# Patient Record
Sex: FEMALE | Race: WHITE
[De-identification: names, ages, dates, MRNs, and addresses within clinical notes are randomized per-mention and may not be internally consistent; named-entity substitution may affect disease eponyms.]

---

## 2017-09-07 ENCOUNTER — HOSPITAL ENCOUNTER (OUTPATIENT)
Dept: HOSPITAL 76 - SDS | Age: 72
Discharge: HOME | End: 2017-09-07
Attending: SURGERY
Payer: MEDICARE

## 2017-09-07 VITALS — SYSTOLIC BLOOD PRESSURE: 113 MMHG | DIASTOLIC BLOOD PRESSURE: 53 MMHG

## 2017-09-07 DIAGNOSIS — K57.90: ICD-10-CM

## 2017-09-07 DIAGNOSIS — D12.0: Primary | ICD-10-CM

## 2017-09-07 DIAGNOSIS — I10: ICD-10-CM

## 2017-09-07 DIAGNOSIS — Z85.3: ICD-10-CM

## 2017-09-07 DIAGNOSIS — K21.9: ICD-10-CM

## 2017-09-07 DIAGNOSIS — Z87.891: ICD-10-CM

## 2017-09-07 PROCEDURE — 0DBH8ZX EXCISION OF CECUM, VIA NATURAL OR ARTIFICIAL OPENING ENDOSCOPIC, DIAGNOSTIC: ICD-10-PCS | Performed by: SURGERY

## 2017-09-07 PROCEDURE — 88305 TISSUE EXAM BY PATHOLOGIST: CPT

## 2017-09-07 PROCEDURE — 45380 COLONOSCOPY AND BIOPSY: CPT

## 2019-02-21 ENCOUNTER — HOSPITAL ENCOUNTER (OUTPATIENT)
Dept: HOSPITAL 76 - ED | Age: 74
Setting detail: OBSERVATION
LOS: 1 days | Discharge: HOME | End: 2019-02-22
Attending: SPECIALIST | Admitting: SPECIALIST
Payer: MEDICAID

## 2019-02-21 DIAGNOSIS — K76.0: ICD-10-CM

## 2019-02-21 DIAGNOSIS — N28.9: ICD-10-CM

## 2019-02-21 DIAGNOSIS — R94.5: ICD-10-CM

## 2019-02-21 DIAGNOSIS — I11.0: Primary | ICD-10-CM

## 2019-02-21 DIAGNOSIS — G35: ICD-10-CM

## 2019-02-21 DIAGNOSIS — Z87.891: ICD-10-CM

## 2019-02-21 DIAGNOSIS — I48.91: ICD-10-CM

## 2019-02-21 DIAGNOSIS — I50.21: ICD-10-CM

## 2019-02-21 DIAGNOSIS — F32.9: ICD-10-CM

## 2019-02-21 DIAGNOSIS — J44.9: ICD-10-CM

## 2019-02-21 DIAGNOSIS — Z85.3: ICD-10-CM

## 2019-02-21 LAB
ALBUMIN DIAFP-MCNC: 4.1 G/DL (ref 3.2–5.5)
ALBUMIN/GLOB SERPL: 1.1 {RATIO} (ref 1–2.2)
ALP SERPL-CCNC: 75 IU/L (ref 42–121)
ALT SERPL W P-5'-P-CCNC: 181 IU/L (ref 10–60)
ANION GAP SERPL CALCULATED.4IONS-SCNC: 14 MMOL/L (ref 6–13)
AST SERPL W P-5'-P-CCNC: 115 IU/L (ref 10–42)
BASOPHILS NFR BLD AUTO: 0.1 10^3/UL (ref 0–0.1)
BASOPHILS NFR BLD AUTO: 0.6 %
BILIRUB BLD-MCNC: 1.9 MG/DL (ref 0.2–1)
BUN SERPL-MCNC: 43 MG/DL (ref 6–20)
CALCIUM UR-MCNC: 10.2 MG/DL (ref 8.5–10.3)
CHLORIDE SERPL-SCNC: 106 MMOL/L (ref 101–111)
CO2 SERPL-SCNC: 20 MMOL/L (ref 21–32)
CREAT SERPLBLD-SCNC: 1.5 MG/DL (ref 0.4–1)
EOSINOPHIL # BLD AUTO: 0.1 10^3/UL (ref 0–0.7)
EOSINOPHIL NFR BLD AUTO: 0.5 %
ERYTHROCYTE [DISTWIDTH] IN BLOOD BY AUTOMATED COUNT: 14.3 % (ref 12–15)
GFRSERPLBLD MDRD-ARVRAT: 34 ML/MIN/{1.73_M2} (ref 89–?)
GLOBULIN SER-MCNC: 3.6 G/DL (ref 2.1–4.2)
GLUCOSE SERPL-MCNC: 123 MG/DL (ref 70–100)
HGB UR QL STRIP: 15 G/DL (ref 12–16)
LIPASE SERPL-CCNC: 40 U/L (ref 22–51)
LYMPHOCYTES # SPEC AUTO: 1.3 10^3/UL (ref 1.5–3.5)
LYMPHOCYTES NFR BLD AUTO: 11.1 %
MCH RBC QN AUTO: 32.8 PG (ref 27–31)
MCHC RBC AUTO-ENTMCNC: 34.7 G/DL (ref 32–36)
MCV RBC AUTO: 94.6 FL (ref 81–99)
MONOCYTES # BLD AUTO: 0.7 10^3/UL (ref 0–1)
MONOCYTES NFR BLD AUTO: 5.7 %
NEUTROPHILS # BLD AUTO: 9.8 10^3/UL (ref 1.5–6.6)
NEUTROPHILS # SNV AUTO: 11.9 X10^3/UL (ref 4.8–10.8)
NEUTROPHILS NFR BLD AUTO: 82.1 %
PDW BLD AUTO: 9.6 FL (ref 7.9–10.8)
PLATELET # BLD: 225 10^3/UL (ref 130–450)
PROT SPEC-MCNC: 7.7 G/DL (ref 6.7–8.2)
RBC MAR: 4.59 10^6/UL (ref 4.2–5.4)
SODIUM SERPLBLD-SCNC: 140 MMOL/L (ref 135–145)

## 2019-02-21 PROCEDURE — 83880 ASSAY OF NATRIURETIC PEPTIDE: CPT

## 2019-02-21 PROCEDURE — 93005 ELECTROCARDIOGRAM TRACING: CPT

## 2019-02-21 PROCEDURE — 76700 US EXAM ABDOM COMPLETE: CPT

## 2019-02-21 PROCEDURE — 80074 ACUTE HEPATITIS PANEL: CPT

## 2019-02-21 PROCEDURE — 85025 COMPLETE CBC W/AUTO DIFF WBC: CPT

## 2019-02-21 PROCEDURE — 84443 ASSAY THYROID STIM HORMONE: CPT

## 2019-02-21 PROCEDURE — 78582 LUNG VENTILAT&PERFUS IMAGING: CPT

## 2019-02-21 PROCEDURE — 96375 TX/PRO/DX INJ NEW DRUG ADDON: CPT

## 2019-02-21 PROCEDURE — 84484 ASSAY OF TROPONIN QUANT: CPT

## 2019-02-21 PROCEDURE — 96376 TX/PRO/DX INJ SAME DRUG ADON: CPT

## 2019-02-21 PROCEDURE — 83690 ASSAY OF LIPASE: CPT

## 2019-02-21 PROCEDURE — 36415 COLL VENOUS BLD VENIPUNCTURE: CPT

## 2019-02-21 PROCEDURE — 93306 TTE W/DOPPLER COMPLETE: CPT

## 2019-02-21 PROCEDURE — 71045 X-RAY EXAM CHEST 1 VIEW: CPT

## 2019-02-21 PROCEDURE — 80053 COMPREHEN METABOLIC PANEL: CPT

## 2019-02-21 PROCEDURE — 80048 BASIC METABOLIC PNL TOTAL CA: CPT

## 2019-02-21 PROCEDURE — 99284 EMERGENCY DEPT VISIT MOD MDM: CPT

## 2019-02-21 PROCEDURE — 99291 CRITICAL CARE FIRST HOUR: CPT

## 2019-02-21 PROCEDURE — 96374 THER/PROPH/DIAG INJ IV PUSH: CPT

## 2019-02-21 RX ADMIN — APIXABAN SCH MG: 2.5 TABLET, FILM COATED ORAL at 21:01

## 2019-02-21 RX ADMIN — SODIUM CHLORIDE, PRESERVATIVE FREE PRN ML: 5 INJECTION INTRAVENOUS at 19:41

## 2019-02-21 NOTE — HISTORY & PHYSICAL EXAMINATION
Chief Complaint





- Chief Complaint


Chief Complaint: Shortness of breath for 4-6 weeks getting gradually progressive







History of Present Illness





- Admitted From


Admitted From:: Home/emergency room





- History Obtained From


Records Reviewed: [facility] and [facility]


History obtained from: Patient and Dr. Dowling


Exam Limitations: None





- History of Present Illness


HPI Comment/Other: 


She is a stanislaw lady who lives in her own home[place]  [place].  She has had no recent travel.  Last time she

flew on a plane was last summer in .  She has not been ill.  No fevers, 

chills.  No unexpected weight changes.  She noticed that she was short of breath

about 4-6 weeks ago.  No antecedent cause.  No leg edema.  No chest pain.  No 

palpitations.  She noted it mainly with exertion and doing simple jobs around 

the house.  She saw her neurologist a couple of weeks into that because she was 

afraid that her MS was flaring up.  Nothing was found.  She is continued to have

the progressive dyspnea.  It is now up to the point where she is short of breath

at rest.  Legs been swollen, a little bit, over the last week.  Sometimes she 

thinks her hands are 2.





She saw Dr. Wong, and he listened to her heart and lungs and did not find 

anything untoward.  He did find that her blood pressure was high in the office 

but by the end of the visit it was gone.





She came to the emergency room today with this complaint and was found to have a

heart rate of 160, blood pressure 134/102 respirations 25 and 95% on room air.  

She is alert and oriented.  Neck is supple.  Her lungs are clear bilaterally.  

And there is no edema in her legs, no calf tenderness no cord tenderness.





She initially was treated with adenosine when the EKG showed to have atrial 

fibrillation and troponin was 0.07.  She did not respond to the adenosine as 

received 3 doses of IV diltiazem at 10 mg each.  She is now in the 90s.  TSH is 

mildly hypothyroid in the sixes.  She has a history of hypothyroidism and is 

compliant with her medications.  She denies chest pain, palpitations.





She is now placed in observation for control of her heart rate, and to see if 

her liver enzyme abnormalities are from passive congestion or something else.








History





- Past Medical History


Cardiovascular: reports: Hypertension


Respiratory: reports: None


Neuro: reports: Multiple sclerosis (Diagnosed in the .  She presented as 

right facial numbness, right arm weakness and inability to write.  That is 

gradually improved.  Urinary incontinence started 2 years ago.  But her disease 

improved with the treatment of B venom from a doctor in [place].  She has not 

had traditional therapy.  She does see a neurologist.)


Endocrine/Autoimmune: reports: HyPOthyroidism


GI: reports: GERD, Colon polyps (tubular adenoma w/o high grae dysplasia or ca 

17), Chronic constipation (w weight loss, nausea 2017. Colonoscopy  

and 17)


GYN: reports: Breast cancer (left mastectomy , right ), Other ()


: reports: Incontinence


HEENT: reports: Chronic vision loss


Psych: reports: Depression (Especially at the time of her second mastectomy when

she underwent chemotherapy.  It really changed everything.  Cymbalta has made 

all the difference in the world and she is still on it.)


Musculoskeletal: reports: Osteoarthritis


Derm: reports: None


MRSA Hx?: No





- Past Surgical History


General: reports: Appendectomy


/GYN: reports:  section, Tubal ligation, Mastectomy





- Family & Social History


Family History Comment/Other: Father had history of heart disease And  at 

approximately age 72.  Mom  at approximately 36 of kidney disease.  Her 

siblings are healthy without heart disease, cancer, heart attack, kidney 

disease, thyroid disease or heart arrhythmia.  Children are healthy.  She has 2.

 One son one daughter.


Living arrangement: At home


Living Situation: Alone


Social History Notes: She smoked up to a pack and a half a day.  Starting around

the age of 13 but really did not  the habit to the age of 18 and stopped 

approximately .  She has no history of alcohol abuse or recreational 

substance abuse.  She was a  for disabled people.  She was , 

but her  left her and the kids about 34 years ago.  She was living in 

[place] when she decided to come to [facility] be [place] to be close to her son.  He

is  and they have 4 children.  She is vaguely disappointed that she does 

not get to see the kids as often as she would like.  They are very busy with 

very busy schedules and there is no a lot of downtime.





- Substance History


Use: Uses substance without health or social issues: NONE


Abuse: Recurrent use of substance despite neg consequences: NONE


Dependence: Experiences withdrawal or developed tolerances: NONE





- POLST


Patient has POLST: No


POLST Status: Full Code





Meds/Allgy





- Home Medications


Home Medications: 


                                Ambulatory Orders











 Medication  Instructions  Recorded  Confirmed


 


RX: Hydrochlorothiazide 1 tab PO DAILY 08/15/16 02/21/19


 


RX: Levothyroxine [Synthroid] 1 tab PO DAILY 08/15/16 02/21/19


 


RX: Omeprazole [PriLOSEC] 1 tab PO DAILY 08/15/16 02/21/19


 


RX: Tramadol HCl 50 - 100 mg PO Q6HR PRN #20 tablet 08/15/16 02/21/19














- Allergies


Allergies/Adverse Reactions: 


                                    Allergies











Allergy/AdvReac Type Severity Reaction Status Date / Time


 


No Known Drug Allergies Allergy   Verified 19 16:54














Review of Systems





- Constitutional


Constitutional: reports: Fatigue.  denies: Fever, Chills, Malaise, Weakness, 

Poor appetite, Diaphoresis, Night sweats





- Eyes


Eyes: denies: Pain, Irritation, Amaurosis, Blurred vision, Spots in vision, 

Field loss, Vision loss





- Ears, Nose & Throat


Ears, Nose & Throat: reports: Hearing loss.  denies: Ear pain, Hearing aids, 

Tinnitus, Vertigo, Nasal pain, Nasal discharge, Nasal obstruction, Nasal 

congestion, Postnasal drainage, Dentures, Sore throat, Hoarseness





- Cardiovascular


Cariovascular: reports: Edema, Exertional dyspnea, Decr. exercise tolerance.  

denies: Irregular heart rate, Palpitations, Chest pain, Lightheadedness, Syncope





- Respiratory


Respiratory: reports: SOB at rest, SOB with exertion.  denies: Cough, Sputum 

production, Wheezing, Snoring, Hemoptysis, Orthopnea





- Gastrointestinal


Gastrointestinal: reports: Constipation.  denies: Abdominal pain, Abdominal 

distention, Diarrhea, Change in bowel habits, Rectal bleeding, Black stools, 

Bloody stools, Vomiting, Bile emesis, Reflux/heartburn





- Genitourinary


Genitourinary: reports: Frequency, Incontinence.  denies: Dysuria, Urgency, 

Hematuria, Flank pain, Nocturia, Urethral discharge





- Musculoskeletal


Musculoskeletal: denies: Muscle pain, Back pain, Muscle aches, Stiffness





- Integumentary


Integumentary: denies: Rash, Pruritis, Lesions, Dryness





- Neurological


Neurological: reports: General weakness, Numbness, Memory problems.  denies: 

Focal weakness, Headache, Dizziness, Pre-existing deficit, Abnormal gait





- Psychiatric


Psychiatric: reports: Depression (controlled).  denies: Anxiety, Suicidal





- Endocrine


Endocrine: denies: Polyuria, Polydypsia, Polyphagia





- Hematologic/Lymphatic


Hematologic/Lymphatic: denies: Anemia


Prior Level of Functionality: 


She lives in her own home.  There is about 3 steps in the house.  Uses no 

durable medical equipment.  Drives a car.  Pays her bills.  Does her own 

housework.








Exam





- Vital Signs


Reviewed Vital Signs: Yes


Vital Signs: 





                                Vital Signs x48h











  Temp Pulse Resp BP Pulse Ox


 


 19 18:03   140 H  16  135/97 H  94


 


 19 17:53  36.8 C  146 H  20  124/107 H  92


 


 19 17:36   146 H  22  141/101 H  95


 


 19 17:14   148 H  18  137/104 H  94


 


 19 16:48   160 H  25 H  134/102 H  95


 


 19 16:38  36.5 C  165 H  20  113/89 H  99














- Physical Exam


General Appearance: positive: No acute distress, Alert, Other (Slightly nasal 

tone of voice, no respiratory distress)


Eyes Bilateral: positive: PERRL, EOMI


ENT: positive: Pharynx nml


Neck: positive: No JVD.  negative: Stiff neck, Carotid bruit


Respiratory: positive: Chest non-tender, No respiratory distress (At this point 

in time.  She was tachypneic when she walked into the emergency room, she has 

slowed down her respiratory rate and is comfortable speaking to me).  negative: 

Wheezes, Rales, Rhonchi


Cardiovascular: positive: Irregularly irregular, Tachycardia, Systolic murmur.  

negative: Gallop/S4, Friction rub


Peripheral Pulses: positive: 1+


Abdomen: positive: Non-tender, No organomegaly, Nml bowel sounds, No distention


Skin: positive: No rash, Warm, Dry


Extremities: positive: Full ROM, No pedal edema.  negative: Calf tenderness, 

Joint swelling, Christy's sign/cords


Neurologic/Psychiatric: positive: Oriented x3, CN's nml (2-12), Motor nml (She 

really does not have any suggestion of MS on physical exam other than loss of 

sensation around her toes), Mood/affect nml.  negative: Sensation nml (Loss of 

sensation on soles of feet and toes), Facial droop, Slurred/abnml speech, 

Depressed mood/affect


Reflexes: Bicep (R): 1+, Bicep (L): 1+, Knee (R): 1+, Knee (L): 1+, Ankle (R): 

0, Ankle (L): 0


Babinski Reflex: Right: Down, Left: Down





Conclusion/Plan





- Problem List


(1) Congestive heart failure


Conclusion/Plan: 


She appears to have CHF from A. fib with RVR.





Plan:


Observation stay


No diuresis at this time.  She has acute kidney insufficiency on lab.  I am 

hoping that if I can slow down her heart rate she will diurese on her own.


Echocardiogram in the morning


BNP in the morning


Qualifiers: 


   Heart failure type: unspecified   Heart failure chronicity: acute   Qualified

Code(s): I50.9 - Heart failure, unspecified   





(2) Atrial fibrillation with RVR


Conclusion/Plan: 


.  Rate is now in the 90s with Cardizem. [id number] score is 4 points which 

gives her a high risk with a 4% thromboembolism risk. Has-bled score is 4 points

with risk of bleeding 8.9%. Alernatives to anticoagulaiton should be cnsidered s

jany she is at high risk. 





Plan:


Echo as above


Give Cardizem  mg right now


Repeat troponins


I have thought about PE because she has a history of breast cancer but this 

woman is not hypoxic, has no chest pain, Homans negative.  I also could also get

get a CT angiogram since her renal failure is present


I thought about starting Xarelto, but there is contraindication between Xarelto 

and Cardizem in a patient with reduced renal function.  As such I am starting 

her on Eliquis 2.5 mg p.o. twice daily.








(3) Abnormal LFTs


Conclusion/Plan: 


Attributed to passive congestion from her A. fib that may been present for the 

last 4 weeks.  Nevertheless, check hepatitis panel, check ultrasound.








(4) Acute kidney insufficiency


Conclusion/Plan: 


We will attempt to make sure that we can find old lab test to see what her pre

vious labs were.  Again, no diuresis.  We will see if slowing down her heart 

rate on its own will improve her kidney insufficiency tomorrow.If it does not, 

consider renal ultrasound.








(5) Multiple sclerosis


Conclusion/Plan: 


unknown type. Her lack of progression on history is fortuitous. I will ask for 

the name of her Neurologist. At this time exam is without change from her 

baseline. 








- Lab Results


Fish Bones: 


                                 19 16:58





                                 19 16:58





- Diagnostic Imaging Results


Diagnostic Imaging Results: positive: Final report reviewed





- EKG Results


EKG Interpreted Independently: No


EKG Comparison: No prior EKG, Other (Atrial fibrillation with RVR, normal axis 

no evidence of MI)





Core Measures





- Anticipated LOS


I expect patient to be DC'd or transferred within 96 hours.: Yes





- DVT/VTE - Prophylaxis


VTE/DVT Device ordered at admit?: Yes

## 2019-02-21 NOTE — ED PHYSICIAN DOCUMENTATION
PD HPI DYSPNEA





- Stated complaint


Stated Complaint: SOB





- Chief complaint


Chief Complaint: Resp





- History obtained from


History obtained from: Patient





- History of Present Illness


Timing - onset: Other (This is a 73-year-old woman with multiple sclerosis, not 

on meds for same without any history of heart problems who presents with 

progressive dyspnea over 3-5 weeks.  There is no orthopnea with it.  She has not

noted pedal edema.  Soundly she went to the office a week ago and was noted to 

have a pulse of 130 however it came down without specific intervention.  She 

denies palpitations or chest pain.  There is a mild cough but not productive.)





Review of Systems


Ten Systems: 10 systems reviewed and negative


Constitutional: reports: Reviewed and negative


Cardiac: denies: Chest pain / pressure, Palpitations, Pedal edema, Calf pain


Respiratory: reports: Dyspnea, Cough.  denies: Hemoptysis, Wheezing





PD PAST MEDICAL HISTORY





- Past Medical History


Cardiovascular: Hypertension


Respiratory: None


Neuro: Multiple sclerosis


Endocrine/Autoimmune: HyPOthyroidism


GI: GERD


: None


HEENT: None


Psych: None


Musculoskeletal: Osteoarthritis


Derm: None





- Past Surgical History


Past Surgical History: Yes


General: Appendectomy


/GYN:  section, Tubal ligation, Mastectomy





- Present Medications


Home Medications: 


                                Ambulatory Orders











 Medication  Instructions  Recorded  Confirmed


 


RX: Hydrochlorothiazide 1 tab PO DAILY 08/15/16 02/21/19


 


RX: Levothyroxine [Synthroid] 1 tab PO DAILY 08/15/16 02/21/19


 


RX: Omeprazole [PriLOSEC] 1 tab PO DAILY 08/15/16 02/21/19


 


RX: Tramadol HCl 50 - 100 mg PO Q6HR PRN #20 tablet 08/15/16 02/21/19














- Allergies


Allergies/Adverse Reactions: 


                                    Allergies











Allergy/AdvReac Type Severity Reaction Status Date / Time


 


No Known Drug Allergies Allergy   Verified 19 16:54














- Social History


Does the pt smoke?: No


Smoking Status: Never smoker





- Family History


Family history: reports: Non contributory





PD ED PE NORMAL





- Vitals


Vital signs reviewed: Yes





- General


General: Alert and oriented X 3, No acute distress





- HEENT


HEENT: PERRL, EOMI





- Neck


Neck: Supple, no meningeal sign, No bony TTP





- Cardiac


Cardiac: Other (Very tachycardic and regular)





- Respiratory


Respiratory: No respiratory distress, Clear bilaterally





- Abdomen


Abdomen: Normal bowel sounds, Soft, Non tender





- Back


Back: No CVA TTP, No spinal TTP





- Derm


Derm: Normal color, Warm and dry





- Extremities


Extremities: No edema, No calf tenderness / cord





- Neuro


Neuro: Alert and oriented X 3, Normal speech





Results





- Vitals


Vitals: 


                               Vital Signs - 24 hr











  19





  16:38 16:48 17:14


 


Temperature 36.5 C  


 


Heart Rate 165 H 160 H 148 H


 


Respiratory 20 25 H 18





Rate   


 


Blood Pressure 113/89 H 134/102 H 137/104 H


 


O2 Saturation 99 95 94














  19





  17:36 17:53 18:03


 


Temperature  36.8 C 


 


Heart Rate 146 H 146 H 140 H


 


Respiratory 22 20 16





Rate   


 


Blood Pressure 141/101 H 124/107 H 135/97 H


 


O2 Saturation 95 92 94








                                     Oxygen











O2 Source                      Room air

















- EKG (time done)


  ** 1649


Rate: Rate (enter#) (158)


Rhythm: SVT.  No: Wide complex tachycardia


Ischemia: Non specific changes


Computer interpretation: Agree with computer





  ** 1744


Rate: Rate (enter#) (146)


Rhythm: SVT (Narrow complex tachycardia again)





  ** 1808


Rate: Rate (enter#) (87)


Rhythm: NSR (With PACs)


Axis: Normal


Intervals: Normal SC


QRS: Normal


Ischemia: Q waves (ant/sept)


Computer interpretation: Agree with computer





- Labs


Labs: 


                                Laboratory Tests











  19





  16:58 16:58 16:58


 


WBC  11.9 H  


 


RBC  4.59  


 


Hgb  15.0  


 


Hct  43.4  


 


MCV  94.6  


 


MCH  32.8 H  


 


MCHC  34.7  


 


RDW  14.3  


 


Plt Count  225  


 


MPV  9.6  


 


Neut # (Auto)  9.8 H  


 


Lymph # (Auto)  1.3 L  


 


Mono # (Auto)  0.7  


 


Eos # (Auto)  0.1  


 


Baso # (Auto)  0.1  


 


Absolute Nucleated RBC  0.01  


 


Nucleated RBC %  0.1  


 


Sodium   140 


 


Potassium   3.6 


 


Chloride   106 


 


Carbon Dioxide   20 L 


 


Anion Gap   14.0 H 


 


BUN   43 H 


 


Creatinine   1.5 H 


 


Estimated GFR (MDRD)   34 L 


 


Glucose   123 H 


 


Calcium   10.2 


 


Total Bilirubin   1.9 H 


 


AST   115 H 


 


ALT   181 H 


 


Alkaline Phosphatase   75 


 


Troponin I    0.07


 


B-Natriuretic Peptide   


 


Total Protein   7.7 


 


Albumin   4.1 


 


Globulin   3.6 


 


Albumin/Globulin Ratio   1.1 


 


Lipase   40 


 


TSH   














  19





  16:58 16:58


 


WBC  


 


RBC  


 


Hgb  


 


Hct  


 


MCV  


 


MCH  


 


MCHC  


 


RDW  


 


Plt Count  


 


MPV  


 


Neut # (Auto)  


 


Lymph # (Auto)  


 


Mono # (Auto)  


 


Eos # (Auto)  


 


Baso # (Auto)  


 


Absolute Nucleated RBC  


 


Nucleated RBC %  


 


Sodium  


 


Potassium  


 


Chloride  


 


Carbon Dioxide  


 


Anion Gap  


 


BUN  


 


Creatinine  


 


Estimated GFR (MDRD)  


 


Glucose  


 


Calcium  


 


Total Bilirubin  


 


AST  


 


ALT  


 


Alkaline Phosphatase  


 


Troponin I  


 


B-Natriuretic Peptide   749 H


 


Total Protein  


 


Albumin  


 


Globulin  


 


Albumin/Globulin Ratio  


 


Lipase  


 


TSH  6.97 H 














PD MEDICAL DECISION MAKING





- ED course


ED course: 





This is a 73-year-old woman with dyspnea who has an SVT, narrow complex.  She 

was attended to immediately.  Vagal maneuvers had no effect as well as a single 

dose of adenosine.  This was followed by 10 mg of diltiazem IV push.


After couple more doses of diltiazem she gradually converted to sinus rhythm but

 was still having a lot of PACs and occasional episodes of rapid A. fib.  The 

remainder of her workup demonstrates hepatic congestion from long-standing A. 

fib causing CHF, spoke with Dr. Flores for observation at 6:10 PM.





- Critical Care


Time(min): 38


Time Includes: Direct patient care, Review records, Reassess patient, Document 

care, Coordinate care, Medical consult, Family consult for tx dec


Data interpretation: Labs, Pulse ox


Procedures excluded from critical care time: EKG





Departure





- Departure


Disposition: ED Place in Observation


Clinical Impression: 


 Congestive heart failure, Atrial fibrillation with RVR





Condition: Serious


Discharge Date/Time: 19 19:10

## 2019-02-21 NOTE — XRAY REPORT
Reason:  tachy

Procedure Date:  02/21/2019   

Accession Number:  [id number] / [id number]                    

Procedure:  XR  - Chest 1 View X-Ray CPT Code:  97584

 

FULL RESULT:

 

 

EXAM:

CHEST RADIOGRAPHY

 

EXAM DATE: 2/21/2019 05:31 PM.

 

CLINICAL HISTORY: Tachy. Shortness of breath.

 

COMPARISON: None.

 

TECHNIQUE: 1 view.

 

FINDINGS:

Lungs/Pleura: Calcified granuloma, lateral left mid lung, otherwise no 

focal opacities evident. No pleural effusion. No pneumothorax.

 

Mediastinum: Within exam limitations, the cardiomediastinal contour is 

normal.

 

Other: Old posterior right eighth rib fracture noted. Right axillary 

clips noted.

 

IMPRESSION: No cardiomegaly or acute pulmonary abnormality.

 

[facility]

## 2019-02-22 VITALS — DIASTOLIC BLOOD PRESSURE: 88 MMHG | SYSTOLIC BLOOD PRESSURE: 114 MMHG

## 2019-02-22 LAB
ANION GAP SERPL CALCULATED.4IONS-SCNC: 15 MMOL/L (ref 6–13)
BASOPHILS NFR BLD AUTO: 0.1 10^3/UL (ref 0–0.1)
BASOPHILS NFR BLD AUTO: 1.2 %
BUN SERPL-MCNC: 42 MG/DL (ref 6–20)
CALCIUM UR-MCNC: 9.3 MG/DL (ref 8.5–10.3)
CHLORIDE SERPL-SCNC: 104 MMOL/L (ref 101–111)
CO2 SERPL-SCNC: 22 MMOL/L (ref 21–32)
CREAT SERPLBLD-SCNC: 1.1 MG/DL (ref 0.4–1)
EOSINOPHIL # BLD AUTO: 0.3 10^3/UL (ref 0–0.7)
EOSINOPHIL NFR BLD AUTO: 3.6 %
ERYTHROCYTE [DISTWIDTH] IN BLOOD BY AUTOMATED COUNT: 14.6 % (ref 12–15)
GFRSERPLBLD MDRD-ARVRAT: 49 ML/MIN/{1.73_M2} (ref 89–?)
GLUCOSE SERPL-MCNC: 109 MG/DL (ref 70–100)
HGB UR QL STRIP: 14.2 G/DL (ref 12–16)
LYMPHOCYTES # SPEC AUTO: 1.5 10^3/UL (ref 1.5–3.5)
LYMPHOCYTES NFR BLD AUTO: 17.2 %
MCH RBC QN AUTO: 31.9 PG (ref 27–31)
MCHC RBC AUTO-ENTMCNC: 33.1 G/DL (ref 32–36)
MCV RBC AUTO: 96.4 FL (ref 81–99)
MONOCYTES # BLD AUTO: 0.6 10^3/UL (ref 0–1)
MONOCYTES NFR BLD AUTO: 6.7 %
NEUTROPHILS # BLD AUTO: 6.3 10^3/UL (ref 1.5–6.6)
NEUTROPHILS # SNV AUTO: 8.8 X10^3/UL (ref 4.8–10.8)
NEUTROPHILS NFR BLD AUTO: 71.3 %
PDW BLD AUTO: 10.3 FL (ref 7.9–10.8)
PLATELET # BLD: 193 10^3/UL (ref 130–450)
RBC MAR: 4.45 10^6/UL (ref 4.2–5.4)
SODIUM SERPLBLD-SCNC: 141 MMOL/L (ref 135–145)

## 2019-02-22 RX ADMIN — SODIUM CHLORIDE, PRESERVATIVE FREE SCH ML: 5 INJECTION INTRAVENOUS at 00:15

## 2019-02-22 RX ADMIN — SODIUM CHLORIDE, PRESERVATIVE FREE SCH ML: 5 INJECTION INTRAVENOUS at 09:05

## 2019-02-22 RX ADMIN — SODIUM CHLORIDE, PRESERVATIVE FREE PRN ML: 5 INJECTION INTRAVENOUS at 00:15

## 2019-02-22 RX ADMIN — DIGOXIN SCH MCG: 0.25 INJECTION INTRAMUSCULAR; INTRAVENOUS at 06:47

## 2019-02-22 RX ADMIN — APIXABAN SCH MG: 2.5 TABLET, FILM COATED ORAL at 09:04

## 2019-02-22 RX ADMIN — DIGOXIN SCH MCG: 0.25 INJECTION INTRAMUSCULAR; INTRAVENOUS at 14:45

## 2019-02-22 NOTE — PROVIDER PROGRESS NOTE
Subjective





- Prog Note Date


Prog Note Date: 02/22/19


Prog Note Time: 07:26





- Subjective


Subjective: 





Her heart rate still 110 to 140 even with the dilt po. Loaded with dig this am. 

She doesn't feel it. 





Current Medications





- Current Medications


Current Medications: 





Active Medications





Acetaminophen (Tylenol)  650 mg PO Q4HR PRN


   PRN Reason: Pain 1 to 4


Apixaban (Eliquis)  2.5 mg PO BID Formerly [facility]


   Last Admin: 02/21/19 21:01 Dose:  2.5 mg


Digoxin (Lanoxin Inj)  125 mcg IVP Q8H Formerly [facility]


   Stop: 02/22/19 14:31


   Last Admin: 02/22/19 06:47 Dose:  125 mcg


Diltiazem HCl (Cardizem Cd)  180 mg PO DAILY Formerly [facility]


   Last Admin: 02/21/19 19:30 Dose:  180 mg


Levothyroxine Sodium (Synthroid)  75 mcg PO QDAC Formerly [facility]


   Last Admin: 02/22/19 06:47 Dose:  75 mcg


Oxycodone HCl (Roxicodone)  5 mg PO Q4HR PRN


   PRN Reason: Pain 5 to 7


Polyethylene Glycol (Miralax)  17 gm PO DAILY Formerly [facility]


Sodium Chloride (Normal Saline Flush 0.9%)  10 ml IVP PRN PRN


   PRN Reason: AS NEEDED PER PROVIDER ORDERS


   Last Admin: 02/22/19 00:15 Dose:  10 ml


Sodium Chloride (Normal Saline Flush 0.9%)  10 ml IVP 0100,0900,1700 Formerly [facility]


   Last Admin: 02/22/19 00:15 Dose:  10 ml


Tramadol HCl (Ultram)  50 mg PO Q4HR PRN


   PRN Reason: PAIN





                                        





Hydrochlorothiazide 1 tab PO DAILY 08/15/16 


Levothyroxine [Synthroid] 1 tab PO DAILY 08/15/16 


Omeprazole [PriLOSEC] 1 tab PO DAILY 08/15/16 











Objective





- Vital Signs/Intake & Output


Vital Signs: 


                                Vital Signs x48h











  Temp Pulse Pulse Pulse Pulse Resp BP


 


 02/22/19 07:04     140 H   


 


 02/22/19 06:50       


 


 02/22/19 06:47   90     


 


 02/22/19 04:23     140 H    104/75


 


 02/22/19 03:51  36.6 C    143 H   18  124/87 H


 


 02/22/19 00:38     140 H   


 


 02/22/19 00:26       


 


 02/22/19 00:15       


 


 02/22/19 00:13      147 H  


 


 02/21/19 23:45  36.4 C L   152 H    18  112/95 H














  Pulse Ox


 


 02/22/19 07:04  95


 


 02/22/19 06:50  90 L


 


 02/22/19 06:47 


 


 02/22/19 04:23 


 


 02/22/19 03:51  97


 


 02/22/19 00:38 


 


 02/22/19 00:26  95


 


 02/22/19 00:15  92


 


 02/22/19 00:13 


 


 02/21/19 23:45  94











Intake & Output: 


                                 Intake & Output











 02/19/19 02/20/19 02/21/19 02/22/19





 23:59 23:59 23:59 23:59


 


Intake Total   627.5 


 


Balance   627.5 














- Lab Results


Fish Bones: 


                                 02/21/19 16:58





                                 02/21/19 16:58


Other Labs: 


                               Lab Results x24hrs











  02/21/19 02/21/19 02/21/19 Range/Units





  21:36 16:58 16:58 


 


WBC     (4.8-10.8)  x10^3/uL


 


RBC     (4.20-5.40)  10^6/uL


 


Hgb     (12.0-16.0)  g/dL


 


Hct     (37.0-47.0)  %


 


MCV     (81.0-99.0)  fL


 


MCH     (27.0-31.0)  pg


 


MCHC     (32.0-36.0)  g/dL


 


RDW     (12.0-15.0)  %


 


Plt Count     (130-450)  10^3/uL


 


MPV     (7.9-10.8)  fL


 


Neut # (Auto)     (1.5-6.6)  10^3/uL


 


Lymph # (Auto)     (1.5-3.5)  10^3/uL


 


Mono # (Auto)     (0.0-1.0)  10^3/uL


 


Eos # (Auto)     (0.0-0.7)  10^3/uL


 


Baso # (Auto)     (0.0-0.1)  10^3/uL


 


Absolute Nucleated RBC     x10^3/uL


 


Nucleated RBC %     /100WBC


 


Sodium     (135-145)  mmol/L


 


Potassium     (3.5-5.0)  mmol/L


 


Chloride     (101-111)  mmol/L


 


Carbon Dioxide     (21-32)  mmol/L


 


Anion Gap     (6-13)  


 


BUN     (6-20)  mg/dL


 


Creatinine     (0.4-1.0)  mg/dL


 


Estimated GFR (MDRD)     (>89)  


 


Glucose     ()  mg/dL


 


Calcium     (8.5-10.3)  mg/dL


 


Total Bilirubin     (0.2-1.0)  mg/dL


 


AST     (10-42)  IU/L


 


ALT     (10-60)  IU/L


 


Alkaline Phosphatase     ()  IU/L


 


Troponin I  0.06    (<0.49)  ng/mL


 


B-Natriuretic Peptide   749 H   (5-100)  pg/mL


 


Total Protein     (6.7-8.2)  g/dL


 


Albumin     (3.2-5.5)  g/dL


 


Globulin     (2.1-4.2)  g/dL


 


Albumin/Globulin Ratio     (1.0-2.2)  


 


Lipase     (22-51)  U/L


 


TSH    6.97 H  (0.34-5.60)  uIU/mL














  02/21/19 02/21/19 02/21/19 Range/Units





  16:58 16:58 16:58 


 


WBC    11.9 H  (4.8-10.8)  x10^3/uL


 


RBC    4.59  (4.20-5.40)  10^6/uL


 


Hgb    15.0  (12.0-16.0)  g/dL


 


Hct    43.4  (37.0-47.0)  %


 


MCV    94.6  (81.0-99.0)  fL


 


MCH    32.8 H  (27.0-31.0)  pg


 


MCHC    34.7  (32.0-36.0)  g/dL


 


RDW    14.3  (12.0-15.0)  %


 


Plt Count    225  (130-450)  10^3/uL


 


MPV    9.6  (7.9-10.8)  fL


 


Neut # (Auto)    9.8 H  (1.5-6.6)  10^3/uL


 


Lymph # (Auto)    1.3 L  (1.5-3.5)  10^3/uL


 


Mono # (Auto)    0.7  (0.0-1.0)  10^3/uL


 


Eos # (Auto)    0.1  (0.0-0.7)  10^3/uL


 


Baso # (Auto)    0.1  (0.0-0.1)  10^3/uL


 


Absolute Nucleated RBC    0.01  x10^3/uL


 


Nucleated RBC %    0.1  /100WBC


 


Sodium   140   (135-145)  mmol/L


 


Potassium   3.6   (3.5-5.0)  mmol/L


 


Chloride   106   (101-111)  mmol/L


 


Carbon Dioxide   20 L   (21-32)  mmol/L


 


Anion Gap   14.0 H   (6-13)  


 


BUN   43 H   (6-20)  mg/dL


 


Creatinine   1.5 H   (0.4-1.0)  mg/dL


 


Estimated GFR (MDRD)   34 L   (>89)  


 


Glucose   123 H   ()  mg/dL


 


Calcium   10.2   (8.5-10.3)  mg/dL


 


Total Bilirubin   1.9 H   (0.2-1.0)  mg/dL


 


AST   115 H   (10-42)  IU/L


 


ALT   181 H   (10-60)  IU/L


 


Alkaline Phosphatase   75   ()  IU/L


 


Troponin I  0.07    (<0.49)  ng/mL


 


B-Natriuretic Peptide     (5-100)  pg/mL


 


Total Protein   7.7   (6.7-8.2)  g/dL


 


Albumin   4.1   (3.2-5.5)  g/dL


 


Globulin   3.6   (2.1-4.2)  g/dL


 


Albumin/Globulin Ratio   1.1   (1.0-2.2)  


 


Lipase   40   (22-51)  U/L


 


TSH     (0.34-5.60)  uIU/mL














ABX Reporting


Has patient been on IV antibiotics over the past 48 hours?: Yes





Assessment/Plan





- Problem List


(1) Congestive heart failure


Impression: 


She appears to have CHF from A. fib with RVR. I have ordered ECHO to establish 

systolic vs diastolic. Had a run of wide complex tachycardia that could have 

been Vtach or SVT w aberrancy. 





Plan:


Observation stay ongoing until I see if she responds by afternoon.


No diuresis at this time.  She has acute kidney insufficiency on lab.  I am 

hoping that if I can slow down her heart rate she will diurese on her own.


Echocardiogram today


BNP in the morning ordered.


Qualifiers: 


   Heart failure type: unspecified   Heart failure chronicity: acute   Qualified

 Code(s): I50.9 - Heart failure, unspecified   





(2) Atrial fibrillation with RVR


Conclusion/Plan: 


.  Rate is now in the 90s with Cardizem. [id number] score is 4 points which 

gives her a high risk with a 4% thromboembolism risk. Has-bled score is 4 points

 with risk of bleeding 8.9%. Alernatives to anticoagulaiton should be cnsidered 

since she is at high risk. 





Plan:


Echo as above


Give Cardizem  mg po last night


Repeat troponins were 0.07.


I have thought about PE because she has a history of breast cancer but this 

woman is not hypoxic, has no chest pain, Homans negative.  I also could also get

 get a CT angiogram since her renal failure is present


I thought about starting Xarelto, but there is contraindication between Xarelto 

and Cardizem in a patient with reduced renal function.  As such I am starting 

her on Eliquis 2.5 mg p.o. twice daily.








(3) Abnormal LFTs


Conclusion/Plan: 


Attributed to passive congestion from her A. fib that may been present for the 

last 4 weeks.  Nevertheless, check hepatitis panel, check ultrasound. Ordered 

and pending.








(4) Acute kidney insufficiency


Conclusion/Plan: 


We will attempt to make sure that we can find old lab test to see what her 

previous labs were.  Again, no diuresis.  We will see if slowing down her heart 

rate on its own will improve her kidney insufficiency tomorrow.If it does not, 

consider renal ultrasound.








(5) Multiple sclerosis


Conclusion/Plan: 


unknown type. Her lack of progression on history is fortuitous. I will ask for 

the name of her Neurologist. At this time exam is without change from her 

baseline.

## 2019-02-22 NOTE — DISCHARGE PLAN
Discharge Plan


Disposition: 01 Home, Self Care


Condition: Good


Prescriptions: 


Carvedilol [Coreg] 12.5 mg PO BID #60 tablet


Furosemide [Lasix] 20 mg PO Q48H #30 tablet


Potassium Chloride [Micro-K] 10 meq PO Q48H #30 capsule


Diet: Low Sodium


Activity Restrictions: Activity as Tolerated


Shower Restrictions: No


Driving Restrictions: No


Instruction Topics:  Heart Failure, Heart Failure Warning Signs, Heart Failure 

Tracking Weight, Heart Failure Being Active, Atrial Fibrillation


Additional Instructions or Follow Up instructions: 


You came to the hospital because you were very, very short of breath when you 

tried to do anything. This has been going on for up to 2 months.  We found you 

to have new congestive heart failure.  Normal ejection fraction is 55-60%.  That

means that every time your heart beats and your heart muscle contracts, 55-60% 

of blood goes forward. Right now only 20% of yours goes forward.  You have been 

started on 3 new medicines: Coreg and Lasix (as well as potassium) to help your 

heart pump better.  You were also in atrial fibrillation which is an irregularly

irregular heartbeat.  We were able to slow down your heart rate so the you went 

into a regular rhythm right before you left the hospital.





Please see your primary care provider in follow-up.  They will need to check 

your blood work to make sure that the new medicines are not affecting your 

kidneys or your potassium.  Dr. Wong will also need to refer you to a 

cardiologist.  We do have cardiologist from the [facility] to come to the 

main hospital building several times a month.  Dr. Wong will make a referral 

for you to see them.





My only other concern was that of possible blood clots to the lungs because you 

were so short of breath.  We did a special perfusion scan of your lungs looking 

for blood clots.  There were none found but the perfusion scan did find you to 

have early signs of emphysema.  Right now you do not have to take any medication

for that but Dr. Wong should monitor you on a regular basis.  He may need to 

put you on bronchodilator inhalers down the road to help you.





Once you see the cardiologist, and see Dr. Wong, you are candidate for an 

exercise class for your heart.  It is a wonderful program that helps heart 

patients maintain strength and endurance.  It is called cardiopulmonary 

rehab.When the time comes and Dr. Wong and the cardiologist states okay, 

please sign up for that class.  It will help you tremendously.


No Smoking: If you smoke, Please STOP!  Call [phone] for help.


Follow-up with: 


Howard Wong MD [Primary Care Provider] -

## 2019-02-22 NOTE — ULTRASOUND REPORT
Reason:  elevated liver enzymes

Procedure Date:  02/22/2019   

Accession Number:  [id number] / [id number]                    

Procedure:  US  - Abdomen Complete CPT Code:  

 

FULL RESULT:

 

 

EXAM:

ABDOMEN ULTRASOUND

 

EXAM DATE: 2/22/2019 05:43 AM.

 

CLINICAL HISTORY: Elevated liver enzymes.

 

COMPARISON: CHEST 1 VIEW 02/21/2019 5:18 PM.

 

TECHNIQUE: Real-time scanning was performed with static images obtained.

 

FINDINGS:

Liver: Heterogeneous liver, with focal fatty infiltration. The liver 

measures 16 cm. Main portal vein flow: Hepatopetal.

 

Gallbladder: Normal. No stones, wall thickening, or sonographic Waggoner's 

sign.

 

Biliary System: Common bile duct measures 5 mm. No intrahepatic or 

extrahepatic ductal dilatation.

 

Pancreas: Visualized portion is unremarkable.

 

Kidneys:

Right: 10.3 cm longitudinally. Normal. No contour-deforming mass, stones, 

or hydronephrosis.

Left: 13.0 cm longitudinally. Multicystic kidney. No hydronephrosis.

 

Spleen: 10.7 cm. Normal in size and echotexture.

 

Aorta and Inferior Vena Cava: Unremarkable.

 

Other: None.

IMPRESSION: Heterogeneous liver, with focal fatty infiltration. No 

evidence of biliary dilatation or cholelithiasis. Multicystic left kidney.

 

RADIA

## 2019-02-22 NOTE — NUCLEAR MEDICINE REPORT
Reason:  sudden sob, new afib

Procedure Date:  02/22/2019   

Accession Number:  [id number] / [id number]                    

Procedure:  NM  - Lung Vent/Perf V/Q CPT Code:  

 

FULL RESULT:

 

 

EXAM:

VENTILATION/PERFUSION SCAN (V/Q SCAN)

 

EXAM DATE: 2/22/2019 12:36 PM.

 

CLINICAL HISTORY: Sudden sob, new afib.

 

COMPARISON: CHEST 1 VIEW 02/21/2019 5:18 PM.

 

TECHNIQUE: Patient was administered 30 mCi of technetium 99m DTPA aerosol 

by inhalation and 8 standard ventilation images of the lungs were 

obtained. Next, the patient was injected with 5.3 mCi of technetium 99m 

MAA intravenously and 8 standard perfusion images of the lungs were 

obtained.

 

FINDINGS:

Ventilation Scan: There is central clumping of the aerosolized 

radiotracer and poor ventilation of the lung parenchyma.

 

Perfusion Scan: Activity preferentially distributes to the anterior lungs 

bilaterally. There is more activity in the left lung than in the right 

lung. No definite mismatched perfusion defects are appreciated.

IMPRESSION:

1. Low probability pattern for acute pulmonary embolism.

2. Abnormal ventilation pattern suggesting obstructive lung disease.

 

RADIA

## 2019-02-23 LAB
HEP C AB SIGNAL CUT-OFF: 0 (ref ?–1)
HEPATITIS A IGM: (no result)
HEPATITIS B CORE ANTIBODY IGM: (no result)
HEPATITIS B SURFACE ANTIGEN: (no result)
HEPATITIS C ANTIBODY: (no result)

## 2019-02-23 NOTE — DISCHARGE SUMMARY
Physician: Evelia Flores MD

DATE OF ADMISSION: 02/21/2019

DATE OF DISCHARGE:  02/22/2019

 

DISCHARGE DIAGNOSES 

1.  Acute systolic congestive heart failure class C.

2.  Atrial fibrillation with rapid ventricular response.

3.  Hypertension.

4.  Multiple sclerosis.

5.  History of breast cancer.

6.  Abnormal liver function studies.

7.  Chronic obstructive pulmonary disease without exacerbation.

 

DISCHARGE MEDICATIONS

New prescriptions:

1.  Coreg 12.5 mg p.o. b.i.d.

2.  Lasix 20 mg p.o. every 48 hours.

3.  Potassium 10 mEq every 48 hours.

 

Resume old medications:

1.  Vitamin C 2000 mg daily.

2.  Calcium 500 mg p.o. b.i.d.

3.  Vitamin D 1000 units daily.

4.  Garlic tablets.

5.  Lactobacillus tablet.

6.  Lecithin tablet.

7.  Synthroid 75 mcg daily.

8.  Multivitamin daily.

9.  Omeprazole 20 mg daily.

10.  Coenzyme Q10 daily.

 

PRINCIPAL PROCEDURES

1.  Chest x-ray with no cardiomegaly or acute cardiopulmonary abnormality.  Old 
posterior right 8th rib fracture noted.

2.  Abdominal ultrasound with fatty infiltration of the liver that is focal.  
Hepatopetal portal vein flow.  No stones.  Multicystic left kidney.  No 
hydronephrosis.

3.  Lung scan/VQ nuclear medicine without pulmonary emboli, abnormal ventilation
pattern suggesting chronic obstructive pulmonary disease.

4.  Echocardiogram (PRELIMINARY REPORT).  Left ventricular size normal.  Left 
ventricular wall thickness normal.  Ejection fraction severely impaired at 20% 
to 25%.  Severe global hypokinesis of LV contractility.  Mild right ventricular 
enlargement.  Mild increased left atrial volume index.  Moderately abnormal 
right heart pressures with RVSP at rest 58 mmHg.

 

HOSPITAL COURSE:  This is a pleasant, 73-year-old female whose main past medical
history consists of hypertension and multiple sclerosis.  She treats her 
multiple sclerosis with bee venom and, since the 1980s when she was treated, she
has done well.  [place] 
also followed CECILIA Aviles.  She has been seen by both of these physicians 
in the last month.  She says that 2 months ago, she began having dyspnea on 
exertion.  Initially, it was slight, but it has become progressive enough that 
now she is dyspneic on exertion at rest.  She was afraid it was multiple 
sclerosis, so her neurologist examined her and said it is not her MS.  She 
states that she was also seen by Dr. Howard Wong, and he told her that her lungs
were clear and that her heart seemed fine.  She continued to progress with the 
dyspnea on exertion to the point that legs were swollen, she could not get 
anything done in the midst of moving from one apartment to the next.  She was so
desperate today that she came to the emergency room.  She is an ex-tobacco 
smoker.  Stopped over 25 years ago.  She does not have high cholesterol, does 
not have diabetes.

 

In the emergency room, she was in atrial fibrillation in the 160s.  BNP was 
mildly elevated.  Liver enzymes were elevated with an AST of 115, .  BNP 
was 749.  She was mildly hypothyroid at 6.97.  Troponin was 0.07.  Chest x-ray 
was negative for cardiomegaly or acute pulmonary process.

 

Treatment consisted of adenosine in the emergency room twice, but no response.  
She then received Cardizem IV push and slowed down after 3 doses of 10 mg.  She 
was placed in observation under telemetry and was in fast atrial fibrillation 
for most of the night.  She was continued on Cardizem IV push, as well 1 dose of
Lopressor.  In the early morning hours of February 22, with rate still 
uncontrolled, she was given digoxin loading.  In the early afternoon on the day 
of discharge, she converted to sinus rhythm.  By then, the echocardiogram was 
obtained, hepatitis panel was obtained.  We did a VQ scan because of her history
of breast cancer and swollen legs, and this resulted in hypercoagulability, 
looking for pulmonary embolus.

 

The echocardiogram is as above with a very low ejection fraction.  As such, her 
Cardizem was switched to Coreg 12.5.  She was already much less dyspneic and in 
sinus rhythm.  She was started on Lasix 20 mg every other day with potassium 
every other day at discharge.  I am asking her to please see her primary care 
provider in the next week.  She needs to have her BNP checked.  She needs to 
have an ACE inhibitor added if her blood pressure will tolerate it.  She also 
needs to be sent to Cardiology for a stress test, an opinion regarding her 
atrial fibrillation.  I did not anticoagulate her.  Her CHADS score was 4 
points, which indicates that she needs anticoagulation, but she converted to 
sinus.  She also has a high HAS-BLED score as well. She may still end up needing
anticoagulation because of the low EF. She may also be a candidate for AICD. 

 

She has a history of hypertension, but she is not on any hypertensive 
medications.  Her blood pressure tolerated the addition of the Cardizem, the 
digoxin.  However, at home, she will be on a different medication.  At 
discharge, blood pressure was 114/88.

 

Abnormal liver function studies were evaluated.  Fatty liver on ultrasound that 
is focal.  She may need an MRI, since it is local infiltration.  Acute hepatitis
panel is pending at the time of discharge and needs to be reviewed.

 

VQ scan done for pulmonary embolus.  We did not do a CT pulmonary angiogram 
because of the high creatinine that she has.  Her creatinine is 1.1 to 1.5.  GFR
is 34-49.  As such, the VQ scan was done and negative for PE, but showed changes
of COPD.  We did not diurese this patient.  On her own with rate control, BNP 
dropped from 749-567, and creatinine improved.

 

She is discharged in stable condition, but she is still very tired.  I explained
it may take a week or two for her to normalize since her atrial fibrillation is 
now sinus rhythm.  Again, strongly encouraged to see Dr. Wong and follow up 
with Cardiology. She will need an ARB and ACE added in the next 1-2 weeks. 

 

PHYSICAL EXAMINATION

VITAL SIGNS:  Temperature is 36.9, pulse 83, blood pressure 114/88, respirations
18, 96% on room air.

GENERAL:  She is an elderly woman, who looks older than her stated age.

NECK:  No JVD.

LUNGS:  Clear.  She does not have crackles, rhonchi or wheezing.

HEART:  PMI is normally placed, from what I can feel.  No murmurs, rubs or 
gallops.

ABDOMEN:  Soft, nontender.  No organomegaly.  The legs have only trace edema.

 

 

DD: 02/22/2019 19:06

TD: 02/22/2019 19:19

Job #: [id number]

CRISTOPHER

## 2019-03-20 ENCOUNTER — HOSPITAL ENCOUNTER (OUTPATIENT)
Dept: HOSPITAL 76 - DI | Age: 74
Discharge: HOME | End: 2019-03-20
Attending: NURSE PRACTITIONER
Payer: MEDICARE

## 2019-03-20 DIAGNOSIS — I50.22: Primary | ICD-10-CM

## 2019-03-20 DIAGNOSIS — R94.31: ICD-10-CM

## 2019-03-20 DIAGNOSIS — N28.1: ICD-10-CM

## 2019-03-20 DIAGNOSIS — I21.19: ICD-10-CM

## 2019-03-20 DIAGNOSIS — K76.0: ICD-10-CM

## 2019-03-20 DIAGNOSIS — M79.605: ICD-10-CM

## 2019-03-20 DIAGNOSIS — M79.604: ICD-10-CM

## 2019-03-20 DIAGNOSIS — K82.8: ICD-10-CM

## 2019-03-20 PROCEDURE — 93925 LOWER EXTREMITY STUDY: CPT

## 2019-03-20 PROCEDURE — 93922 UPR/L XTREMITY ART 2 LEVELS: CPT

## 2019-03-20 PROCEDURE — 78452 HT MUSCLE IMAGE SPECT MULT: CPT

## 2019-03-20 PROCEDURE — 74183 MRI ABD W/O CNTR FLWD CNTR: CPT

## 2019-03-20 PROCEDURE — 93017 CV STRESS TEST TRACING ONLY: CPT

## 2019-03-20 NOTE — MRI REPORT
Reason:  FATTY LIVER,NOT ELSEWHERE CLASSIFIED

Procedure Date:  03/20/2019   

Accession Number:  [id number] / [id number]                    

Procedure:  MRI - Abdomen W/WO CPT Code:  

 

FULL RESULT:

 

 

EXAM:

MR ABDOMEN WITH AND WITHOUT CONTRAST

(MR LIVER)

 

EXAM DATE: 3/20/2019 09:53 AM.

 

CLINICAL HISTORY: Fatty liver, not elsewhere classified.

 

COMPARISON: None.

 

TECHNIQUE: Multiplanar breath-hold T1, T2, and DWI sequences obtained 

through the abdomen on an MR scanner. Images obtained before and after 

administration of 8 mL Gadavist intravenous contrast. Multiphase 

postcontrast images obtained of the liver and abdomen.

 

FINDINGS:

Liver: No imaging findings to indicate cirrhosis are demonstrated on this 

examination. No suspicious hepatic mass is demonstrated at this time. 

There is mild hepatic steatosis.

 

Gallbladder: There is gallbladder sludge. No pathologic wall thickening 

is noted.

 

Bile ducts: No pathologic biliary dilation.

 

Pancreas: No suspicious pancreatic lesion. There is a tiny, less than 5 

mm, cystic abnormality within the tail of the pancreas, difficult to 

characterize. No communication demonstrated with the ductal system. 

Incidental note made of pancreas divisum.

 

Kidneys: There is a small cyst within the midportion of the right kidney. 

There is moderate parenchymal scarring in the left kidney. Multiple 

cystic lesions are noted within the left kidney, largest within the left 

lower kidney, measuring 6.9 cm.

 

Spleen: No suspicious splenic lesion is noted.

 

Bowel: No bowel obstruction is demonstrated.

 

Vasculature: Normal caliber aorta. Portal, hepatic, and superior 

mesenteric veins are patent.

 

Other: No free fluid or pathologic lymphadenopathy.

 

Bones: No suspicious bone lesion.

 

Lower chest: No significant consolidation or effusion.

IMPRESSION:

1. Mild hepatic steatosis.

2. No suspicious hepatic lesion. No imaging findings of cirrhosis.

3. Gallbladder sludge. No gallstones. No cholecystitis or biliary 

dilation.

4. Pancreas divisum is incidentally noted.

5. Moderate scarring within the left kidney with polycystic change, with 

a dominant left lower kidney cyst measuring 6.9 cm.

 

RADIA

## 2019-03-21 NOTE — NUCLEAR MEDICINE REPORT
Reason:  HEART FAILURE

Procedure Date:  03/20/2019   

Accession Number:  [id number] / [id number]                    

Procedure:  NM  - Myocardial Perfusion STR/RST CPT Code:  

 

FULL RESULT:

 

 

EXAM:

MYOCARDIAL PERFUSION STRESS AND REST PHARMACEUTICAL

 

EXAM DATE: 3/20/2019 03:10 AM.

 

CLINICAL HISTORY: Heart failure.

 

COMPARISON: CHEST 1 VIEW 02/21/2019 5:18 PM.

 

TECHNIQUE: Patient given 9.7 mCi technetium 99m sestamibi IV for the rest 

portion of the exam. Non-gated cardiac SPECT scintigraphy performed with 

multiplanar reformats.

 

After an appropriate delay, patient given 0.4 mg Lexiscan for 

pharmacologic stress. Next, patient given 42.8 mCi technetium 99m 

sestamibi IV. Cardiac gated SPECT scintigraphy performed with multiplanar 

reformats, wall motion analysis, and left ventricular ejection fraction 

estimation.

 

FINDINGS:

There is a large focus of moderate decreased activity in the inferior 

wall from apex to base extending both inferoseptal and inferolateral and 

apical. This is fixed with no significant reversibility.

 

Moderate-sized focus of mild decreased activity in the septum from apex 

to base, fixed on stress and rest.

 

No reversible defects are seen.

 

Inferior wall hypokinesis. Mild septal hypokinesis.

 

Left ventricular ejection fraction estimated at 49%.

IMPRESSION:

1. Large inferior wall infarct extending inferoseptal, inferolateral and 

apical.

2. Probable nontransmural infarct of the septum.

3. No scintigraphic evidence of inducible ischemia.

4. Inferior wall and septal hypokinesis.

5. Left ventricular ejection fraction estimated at 49%.

 

RADIA

## 2019-03-22 NOTE — ULTRASOUND REPORT
Reason:  PAIN IN BILAT LEG

Procedure Date:  03/20/2019   

Accession Number:  [id number] / [id number]                    

Procedure:  US  - Duplex Lwr Ext Arterial Bilat CPT Code:  

 

FULL RESULT:

 

 

EXAM:

BILATERAL LOWER EXTREMITY ARTERIAL DOPPLER ULTRASOUND AND ANKLE-BRACHIAL 

INDEX STUDY.

 

EXAM DATE: 3/20/2019 09:12 AM.

 

CLINICAL HISTORY: Pain in bilateral legs.

 

COMPARISON: None.

 

TECHNIQUE: Real-time sonographic vascular imaging was performed by the 

sonographer, utilizing color-flow, Doppler flow, and spectral analysis. 

Multiple representative static images were saved for review.

 

FINDINGS: No evidence of inflow disease of either side. Monophasic 

waveforms noted in the right posterior tibial, peroneal and dorsalis 

pedis arteries. Monophasic waveform noted in the left dorsalis pedis 

artery. Other waveforms are triphasic.

 

Right arm brachial pressure 101/69 mmHg; right ankle pressure 115/67 

mmHg, for an ankle/brachial index of 1.1.

 

Left arm brachial pressure 104/84 mmHg; left ankle pressure 113/72 mmHg, 

for an ankle/brachial index of 1.0.

 

Right Lower Extremity:

CFA: PSV 97 cm/sec, triphasic.

PSFA: PSV 67 cm/sec, triphasic.

MSFA: PSV 75 cm/sec, triphasic.

DSFA: PSV 72 cm/sec, triphasic.

PFA: PSV 45 cm/sec, triphasic.

POP: PSV 72 cm/sec, triphasic.

DONATO: PSV 43 cm/sec, triphasic.

PTA: PSV 82 cm/sec, monophasic.

PINKY: PSV 42 cm/sec, monophasic.

DPA: PSV 48 cm/sec, monophasic.

 

Left Lower Extremity:

CFA: PSV 81 cm/sec, triphasic.

PSFA: PSV 72 cm/sec, triphasic.

MSFA: PSV 72 cm/sec, triphasic.

DSFA: PSV 70 cm/sec, triphasic.

PFA: PSV 50 cm/sec, triphasic.

POP: PSV 52 cm/sec, triphasic.

DONATO: PSV 43 cm/sec, triphasic.

PTA: PSV 78 cm/sec, triphasic.

PINKY: PSV 71 cm/sec, triphasic.

DPA: PSV 36 cm/sec, monophasic.

 

Systolic Pressures:

Right

Brachial Systolic BP: 101/69 mmHg.

Ankle Systolic BP: 115/67 mmHg.

Ankle/Arm Index: 1.1

 

Left

Brachial Systolic BP: 104/84 mmHg.

Ankle Systolic BP: 113/72 mmHg.

Ankle/Arm Index: 1.0

IMPRESSION:

1. No evidence of significant inflow disease.

2. Monophasic waveforms noted in distal trifurcation vessels right 

greater than left.

3. Normal ankle brachial indices bilaterally.

 

[facility]IA

## 2019-03-22 NOTE — ULTRASOUND REPORT
Reason:  BILATERAL LEG PAIN

Procedure Date:  03/20/2019   

Accession Number:  [id number] / [id number]                    

Procedure:  US  - Ankle Brachial Index CPT Code:  

 

FULL RESULT:

 

 

EXAM:

BILATERAL LOWER EXTREMITY ARTERIAL DOPPLER ULTRASOUND AND ANKLE-BRACHIAL 

INDEX STUDY.

 

EXAM DATE: 3/20/2019 09:12 AM.

 

CLINICAL HISTORY: Pain in bilateral legs.

 

COMPARISON: None.

 

TECHNIQUE: Real-time sonographic vascular imaging was performed by the 

sonographer, utilizing color-flow, Doppler flow, and spectral analysis. 

Multiple representative static images were saved for review.

 

FINDINGS: No evidence of inflow disease of either side. Monophasic 

waveforms noted in the right posterior tibial, peroneal and dorsalis 

pedis arteries. Monophasic waveform noted in the left dorsalis pedis 

artery. Other waveforms are triphasic.

 

Right arm brachial pressure 101/69 mmHg; right ankle pressure 115/67 

mmHg, for an ankle/brachial index of 1.1.

 

Left arm brachial pressure 104/84 mmHg; left ankle pressure 113/72 mmHg, 

for an ankle/brachial index of 1.0.

 

Right Lower Extremity:

CFA: PSV 97 cm/sec, triphasic.

PSFA: PSV 67 cm/sec, triphasic.

MSFA: PSV 75 cm/sec, triphasic.

DSFA: PSV 72 cm/sec, triphasic.

PFA: PSV 45 cm/sec, triphasic.

POP: PSV 72 cm/sec, triphasic.

DONATO: PSV 43 cm/sec, triphasic.

PTA: PSV 82 cm/sec, monophasic.

PINKY: PSV 42 cm/sec, monophasic.

DPA: PSV 48 cm/sec, monophasic.

 

Left Lower Extremity:

CFA: PSV 81 cm/sec, triphasic.

PSFA: PSV 72 cm/sec, triphasic.

MSFA: PSV 72 cm/sec, triphasic.

DSFA: PSV 70 cm/sec, triphasic.

PFA: PSV 50 cm/sec, triphasic.

POP: PSV 52 cm/sec, triphasic.

DONATO: PSV 43 cm/sec, triphasic.

PTA: PSV 78 cm/sec, triphasic.

PINKY: PSV 71 cm/sec, triphasic.

DPA: PSV 36 cm/sec, monophasic.

 

Systolic Pressures:

Right

Brachial Systolic BP: 101/69 mmHg.

Ankle Systolic BP: 115/67 mmHg.

Ankle/Arm Index: 1.1

 

Left

Brachial Systolic BP: 104/84 mmHg.

Ankle Systolic BP: 113/72 mmHg.

Ankle/Arm Index: 1.0

IMPRESSION:

1. No evidence of significant inflow disease.

2. Monophasic waveforms noted in distal trifurcation vessels right 

greater than left.

3. Normal ankle brachial indices bilaterally.

 

RADIA

## 2019-04-04 ENCOUNTER — HOSPITAL ENCOUNTER (EMERGENCY)
Dept: HOSPITAL 76 - ED | Age: 74
Discharge: HOME | End: 2019-04-04
Payer: MEDICARE

## 2019-04-04 VITALS — DIASTOLIC BLOOD PRESSURE: 70 MMHG | SYSTOLIC BLOOD PRESSURE: 103 MMHG

## 2019-04-04 DIAGNOSIS — R94.31: ICD-10-CM

## 2019-04-04 DIAGNOSIS — R94.5: Primary | ICD-10-CM

## 2019-04-04 DIAGNOSIS — I10: ICD-10-CM

## 2019-04-04 DIAGNOSIS — R06.00: ICD-10-CM

## 2019-04-04 LAB
ALBUMIN DIAFP-MCNC: 3.8 G/DL (ref 3.2–5.5)
ALBUMIN/GLOB SERPL: 1.1 {RATIO} (ref 1–2.2)
ALP SERPL-CCNC: 53 IU/L (ref 42–121)
ALT SERPL W P-5'-P-CCNC: 131 IU/L (ref 10–60)
ANION GAP SERPL CALCULATED.4IONS-SCNC: 13 MMOL/L (ref 6–13)
AST SERPL W P-5'-P-CCNC: 148 IU/L (ref 10–42)
BASOPHILS NFR BLD AUTO: 0.1 10^3/UL (ref 0–0.1)
BASOPHILS NFR BLD AUTO: 1.4 %
BILIRUB BLD-MCNC: 0.9 MG/DL (ref 0.2–1)
BUN SERPL-MCNC: 26 MG/DL (ref 6–20)
CALCIUM UR-MCNC: 9.6 MG/DL (ref 8.5–10.3)
CHLORIDE SERPL-SCNC: 104 MMOL/L (ref 101–111)
CO2 SERPL-SCNC: 23 MMOL/L (ref 21–32)
CREAT SERPLBLD-SCNC: 1.4 MG/DL (ref 0.4–1)
EOSINOPHIL # BLD AUTO: 0.3 10^3/UL (ref 0–0.7)
EOSINOPHIL NFR BLD AUTO: 3.7 %
ERYTHROCYTE [DISTWIDTH] IN BLOOD BY AUTOMATED COUNT: 15.9 % (ref 12–15)
GFRSERPLBLD MDRD-ARVRAT: 37 ML/MIN/{1.73_M2} (ref 89–?)
GLOBULIN SER-MCNC: 3.6 G/DL (ref 2.1–4.2)
GLUCOSE SERPL-MCNC: 89 MG/DL (ref 70–100)
HGB UR QL STRIP: 14.6 G/DL (ref 12–16)
LIPASE SERPL-CCNC: 34 U/L (ref 22–51)
LYMPHOCYTES # SPEC AUTO: 1.7 10^3/UL (ref 1.5–3.5)
LYMPHOCYTES NFR BLD AUTO: 22.5 %
MCH RBC QN AUTO: 30.9 PG (ref 27–31)
MCHC RBC AUTO-ENTMCNC: 32.3 G/DL (ref 32–36)
MCV RBC AUTO: 95.7 FL (ref 81–99)
MONOCYTES # BLD AUTO: 0.6 10^3/UL (ref 0–1)
MONOCYTES NFR BLD AUTO: 7.5 %
NEUTROPHILS # BLD AUTO: 4.9 10^3/UL (ref 1.5–6.6)
NEUTROPHILS # SNV AUTO: 7.6 X10^3/UL (ref 4.8–10.8)
NEUTROPHILS NFR BLD AUTO: 64.9 %
PDW BLD AUTO: 9.1 FL (ref 7.9–10.8)
PLATELET # BLD: 198 10^3/UL (ref 130–450)
PROT SPEC-MCNC: 7.4 G/DL (ref 6.7–8.2)
RBC MAR: 4.71 10^6/UL (ref 4.2–5.4)
SODIUM SERPLBLD-SCNC: 140 MMOL/L (ref 135–145)

## 2019-04-04 PROCEDURE — 99283 EMERGENCY DEPT VISIT LOW MDM: CPT

## 2019-04-04 PROCEDURE — 96360 HYDRATION IV INFUSION INIT: CPT

## 2019-04-04 PROCEDURE — 93005 ELECTROCARDIOGRAM TRACING: CPT

## 2019-04-04 PROCEDURE — 99284 EMERGENCY DEPT VISIT MOD MDM: CPT

## 2019-04-04 PROCEDURE — 84484 ASSAY OF TROPONIN QUANT: CPT

## 2019-04-04 PROCEDURE — 80053 COMPREHEN METABOLIC PANEL: CPT

## 2019-04-04 PROCEDURE — 71045 X-RAY EXAM CHEST 1 VIEW: CPT

## 2019-04-04 PROCEDURE — 85025 COMPLETE CBC W/AUTO DIFF WBC: CPT

## 2019-04-04 PROCEDURE — 36415 COLL VENOUS BLD VENIPUNCTURE: CPT

## 2019-04-04 PROCEDURE — 83690 ASSAY OF LIPASE: CPT

## 2019-04-04 NOTE — XRAY REPORT
Reason:  Chest Pain

Procedure Date:  04/04/2019   

Accession Number:  [id number] / [id number]                    

Procedure:  XR  - Chest 1 View X-Ray CPT Code:  42012

 

FULL RESULT:

 

 

EXAM:

CHEST RADIOGRAPHY

 

EXAM DATE: 4/4/2019 07:54 PM.

 

CLINICAL HISTORY: Chest Pain.

 

COMPARISON: CHEST 1 VIEW 02/21/2019 5:18 PM.

 

TECHNIQUE: 1 view.

 

FINDINGS:

Lungs/Pleura: Small calcified granulomata. No definite acute infiltrate, 

consolidation, effusion, or pneumothorax.

 

Mediastinum: Within exam limitations, the cardiomediastinal contour is 

normal. Upper lobe vessels not distended.

 

Other: Surgical clips in the right axilla.

 

IMPRESSION: No acute disease.

 

[facility]

## 2019-04-04 NOTE — ED PHYSICIAN DOCUMENTATION
PD HPI DYSPNEA





- Stated complaint


Stated Complaint: DIFFICULTY BREATHING





- Chief complaint


Chief Complaint: Cardiac





- History obtained from


History obtained from: Patient





- History of Present Illness


Timing - onset: How many hours ago (2)


Timing - onset during: Rest


Timing - duration: Hours (1.5)


Timing - details: Now resolved


Pain level max: 0


Pain level now: 0


Improved by: Rest


Worsened by: Exertion


Associated symptoms: Other (states feels short of breath when she has afib.)


Similar symptoms before: Has not had sx before


Recently seen: Not recently seen





- Additional information


Additional information: 





73-year-old female with dyspnea today.  Resolved upon arrival to the emergency 

department.  She is asymptomatic currently.  She is scheduled for cardiac 

ablation on Monday for intermittent A. fib.  She states that this dyspnea is 

similar to when she has Atrial fibrillation





Review of Systems


Ten Systems: 10 systems reviewed and negative


Constitutional: denies: Fever


Nose: denies: Rhinorrhea / runny nose, Congestion


Respiratory: denies: Cough


GI: denies: Nausea, Vomiting, Diarrhea


Skin: denies: Rash





PD PAST MEDICAL HISTORY





- Past Medical History


Cardiovascular: Hypertension


Respiratory: None


Neuro: Multiple sclerosis (Diagnosed in the .  She presented as right 

facial numbness, right arm weakness and inability to write.  That is gradually 

improved.  Urinary incontinence started 2 years ago.  But her disease improved 

with the treatment of B venom from a doctor in [place].  She has not had 

traditional therapy.  She does see a neurologist.)


Endocrine/Autoimmune: HyPOthyroidism


GI: GERD, Colon polyps (tubular adenoma w/o high grae dysplasia or ca 17), 

Chronic constipation (w weight loss, nausea 2017. Colonoscopy  and 17)


GYN: Breast cancer (left mastectomy , right ), Other ()


: Incontinence


HEENT: Chronic vision loss


Psych: Depression (Especially at the time of her second mastectomy when she 

underwent chemotherapy.  It really changed everything.  Cymbalta has made all 

the difference in the world and she is still on it.)


Musculoskeletal: Osteoarthritis


Derm: None





- Past Surgical History


Past Surgical History: Yes


General: Appendectomy


/GYN:  section, Tubal ligation, Mastectomy





- Present Medications


Home Medications: 


                                Ambulatory Orders











 Medication  Instructions  Recorded  Confirmed


 


Levothyroxine [Synthroid] 75 mcg PO DAILY 08/15/16 02/22/19


 


Omeprazole [PriLOSEC] 20 mg PO DAILY 08/15/16 02/22/19


 


Tramadol HCl 50 - 100 mg PO Q6HR PRN #20 tablet 08/15/16 02/21/19


 


Ascorbic Acid 2,000 mg PO DAILY 19


 


Calcium Carbonate [[id number]] 500 mg PO BID 19


 


Cholecalciferol (Vitamin D3) 1,000 unit PO DAILY 19





[Vitamin D3]   


 


Furosemide [Lasix] 20 mg PO Q48H #30 tablet 19


 


Lactobacillus Acidophilus 1 each PO DAILY 19





[Probiotic Acidophilus]   


 


Lecithin, Soy [Lecithin] 1,200 mg PO DAILY 19


 


Multivitamin [Theragran] 1 each PO DAILY 19


 


Potassium Chloride [Micro-K] 10 meq PO Q48H #30 capsule 19 














- Allergies


Allergies/Adverse Reactions: 


                                    Allergies











Allergy/AdvReac Type Severity Reaction Status Date / Time


 


No Known Drug Allergies Allergy   Verified 19 19:15














- Social History


Does the pt smoke?: No


Smoking Status: Never smoker





- POLST


Patient has POLST: No


POLST Status: Full Code





PD ED PE NORMAL





- Vitals


Vital signs reviewed: Yes





- General


General: Alert and oriented X 3, No acute distress





- HEENT


HEENT: Moist mucous membranes





- Neck


Neck: Supple, no meningeal sign





- Cardiac


Cardiac: RRR, Strong equal pulses





- Respiratory


Respiratory: No respiratory distress, Clear bilaterally





- Abdomen


Abdomen: Soft, Non tender, Non distended





- Derm


Derm: Warm and dry





- Extremities


Extremities: No calf tenderness / cord





- Neuro


Neuro: Alert and oriented X 3





- Psych


Psych: Normal mood, Normal affect





Results





- Vitals


Vitals: 


                               Vital Signs - 24 hr











  19





  19:13 19:54 20:09


 


Temperature 36.0 C L  


 


Heart Rate 72 73 115 H


 


Respiratory 95 H 18 18





Rate   


 


Blood Pressure 83/67 L 84/67 L 100/81 H


 


O2 Saturation 95 98 96














  19





  20:46


 


Temperature 36.2 C L


 


Heart Rate 115 H


 


Respiratory 24





Rate 


 


Blood Pressure 103/70


 


O2 Saturation 97








                                     Oxygen











O2 Source                      Room air

















- EKG (time done)


  ** 1928


Rate: Rate (enter#) (64)


Rhythm: NSR


Axis: Normal


Intervals: Normal WI


QRS: Normal


Ischemia: Normal ST segments





- Labs


Labs: 


                                Laboratory Tests











  19





  19:29 19:29 19:29


 


WBC  7.6  


 


RBC  4.71  


 


Hgb  14.6  


 


Hct  45.0  


 


MCV  95.7  


 


MCH  30.9  


 


MCHC  32.3  


 


RDW  15.9 H  


 


Plt Count  198  


 


MPV  9.1  


 


Neut # (Auto)  4.9  


 


Lymph # (Auto)  1.7  


 


Mono # (Auto)  0.6  


 


Eos # (Auto)  0.3  


 


Baso # (Auto)  0.1  


 


Absolute Nucleated RBC  0.01  


 


Nucleated RBC %  0.1  


 


Sodium   140 


 


Potassium   4.0 


 


Chloride   104 


 


Carbon Dioxide   23 


 


Anion Gap   13.0 


 


BUN   26 H 


 


Creatinine   1.4 H 


 


Estimated GFR (MDRD)   37 L 


 


Glucose   89 


 


Calcium   9.6 


 


Total Bilirubin   0.9 


 


AST   148 H 


 


ALT   131 H 


 


Alkaline Phosphatase   53 


 


Troponin I    < 0.04


 


Total Protein   7.4 


 


Albumin   3.8 


 


Globulin   3.6 


 


Albumin/Globulin Ratio   1.1 


 


Lipase   34 














- Rads (name of study)


  ** cxr


Radiology: Prelim report reviewed, EMP read contemporaneously, See rad report 

(No acute disease)





PD MEDICAL DECISION MAKING





- ED course


Complexity details: reviewed results, re-evaluated patient, considered 

differential, d/w patient


ED course: 





73-year-old female with dyspnea earlier today that resolved.  This appears 

secondary to an episode of A. fib.  No acute findings on laboratory testing, EKG

or chest x-ray.  Asymptomatic in the emergency department.  She does state that 

her blood pressure normally runs low,  systolic.  Patient is ambulating 

throughout the emergency department without any difficulty.  No shortness of 

breath.  Will follow up with her doctor for further care.  Patient counseled 

regarding signs and symptoms for which I believe and urgent re-evaluation would 

be necessary. Patient with good understanding of and agreement to plan and is 

comfortable going home at this time





This document was made in part using voice recognition software. While efforts 

are made to proofread this document, sound alike and grammatical errors may 

occur.





Departure





- Departure


Disposition:  Home, Self Care


Clinical Impression: 


 Abnormal LFTs





Dyspnea


Qualifiers:


 Dyspnea type: unspecified Qualified Code(s): R06.00 - Dyspnea, unspecified





Condition: Good


Instructions:  ED Dyspnea Shortness of Breath


Follow-Up: 


Howard Wong MD [Primary Care Provider] - 


Comments: 


Return if you worsen.  This may be related to your atrial fibrillation. Your 

symptoms have resolved here tonight.  You need to follow-up closely with your 

doctor for further care.


Discharge Date/Time: 19 20:50

## 2019-06-13 ENCOUNTER — HOSPITAL ENCOUNTER (OUTPATIENT)
Dept: HOSPITAL 76 - LAB | Age: 74
Discharge: HOME | End: 2019-06-13
Attending: INTERNAL MEDICINE
Payer: MEDICARE

## 2019-06-13 DIAGNOSIS — I48.91: Primary | ICD-10-CM

## 2019-06-13 LAB
ALBUMIN DIAFP-MCNC: 3.9 G/DL (ref 3.2–5.5)
ALBUMIN/GLOB SERPL: 1 {RATIO} (ref 1–2.2)
ALP SERPL-CCNC: 69 IU/L (ref 42–121)
ALT SERPL W P-5'-P-CCNC: 16 IU/L (ref 10–60)
ANION GAP SERPL CALCULATED.4IONS-SCNC: 12 MMOL/L (ref 6–13)
AST SERPL W P-5'-P-CCNC: 21 IU/L (ref 10–42)
BASOPHILS NFR BLD AUTO: 0.1 10^3/UL (ref 0–0.1)
BASOPHILS NFR BLD AUTO: 1.2 %
BILIRUB BLD-MCNC: 0.6 MG/DL (ref 0.2–1)
BUN SERPL-MCNC: 37 MG/DL (ref 6–20)
CALCIUM UR-MCNC: 10 MG/DL (ref 8.5–10.3)
CHLORIDE SERPL-SCNC: 101 MMOL/L (ref 101–111)
CO2 SERPL-SCNC: 26 MMOL/L (ref 21–32)
CREAT SERPLBLD-SCNC: 1.2 MG/DL (ref 0.4–1)
EOSINOPHIL # BLD AUTO: 0.3 10^3/UL (ref 0–0.7)
EOSINOPHIL NFR BLD AUTO: 4.5 %
ERYTHROCYTE [DISTWIDTH] IN BLOOD BY AUTOMATED COUNT: 14.9 % (ref 12–15)
GFRSERPLBLD MDRD-ARVRAT: 44 ML/MIN/{1.73_M2} (ref 89–?)
GLOBULIN SER-MCNC: 3.8 G/DL (ref 2.1–4.2)
GLUCOSE SERPL-MCNC: 76 MG/DL (ref 70–100)
HGB UR QL STRIP: 14.2 G/DL (ref 12–16)
LYMPHOCYTES # SPEC AUTO: 1.4 10^3/UL (ref 1.5–3.5)
LYMPHOCYTES NFR BLD AUTO: 19 %
MCH RBC QN AUTO: 30.4 PG (ref 27–31)
MCHC RBC AUTO-ENTMCNC: 33 G/DL (ref 32–36)
MCV RBC AUTO: 92 FL (ref 81–99)
MONOCYTES # BLD AUTO: 0.7 10^3/UL (ref 0–1)
MONOCYTES NFR BLD AUTO: 9.2 %
NEUTROPHILS # BLD AUTO: 4.9 10^3/UL (ref 1.5–6.6)
NEUTROPHILS # SNV AUTO: 7.5 X10^3/UL (ref 4.8–10.8)
NEUTROPHILS NFR BLD AUTO: 66.1 %
PDW BLD AUTO: 7.6 FL (ref 7.9–10.8)
PLATELET # BLD: 280 10^3/UL (ref 130–450)
PROT SPEC-MCNC: 7.7 G/DL (ref 6.7–8.2)
RBC MAR: 4.68 10^6/UL (ref 4.2–5.4)
SODIUM SERPLBLD-SCNC: 139 MMOL/L (ref 135–145)

## 2019-06-13 PROCEDURE — 85025 COMPLETE CBC W/AUTO DIFF WBC: CPT

## 2019-06-13 PROCEDURE — 80053 COMPREHEN METABOLIC PANEL: CPT

## 2019-06-13 PROCEDURE — 36415 COLL VENOUS BLD VENIPUNCTURE: CPT

## 2019-08-30 ENCOUNTER — HOSPITAL ENCOUNTER (OUTPATIENT)
Dept: HOSPITAL 76 - DI | Age: 74
Discharge: HOME | End: 2019-08-30
Attending: INTERNAL MEDICINE
Payer: MEDICARE

## 2019-08-30 DIAGNOSIS — R00.1: ICD-10-CM

## 2019-08-30 DIAGNOSIS — I51.7: ICD-10-CM

## 2019-08-30 DIAGNOSIS — I47.1: Primary | ICD-10-CM

## 2019-08-30 PROCEDURE — 93306 TTE W/DOPPLER COMPLETE: CPT

## 2020-05-23 ENCOUNTER — HOSPITAL ENCOUNTER (OUTPATIENT)
Dept: HOSPITAL 76 - LAB | Age: 75
Discharge: HOME | End: 2020-05-23
Attending: INTERNAL MEDICINE
Payer: MEDICARE

## 2020-05-23 DIAGNOSIS — I48.91: Primary | ICD-10-CM

## 2020-05-23 LAB
CREAT SERPLBLD-SCNC: 1.4 MG/DL (ref 0.4–1)
ERYTHROCYTE [DISTWIDTH] IN BLOOD BY AUTOMATED COUNT: 13.6 % (ref 12–15)
HGB UR QL STRIP: 13.7 G/DL (ref 12–16)
MCH RBC QN AUTO: 32.2 PG (ref 27–31)
MCHC RBC AUTO-ENTMCNC: 33.3 G/DL (ref 32–36)
MCV RBC AUTO: 96.9 FL (ref 81–99)
NEUTROPHILS # SNV AUTO: 6.4 X10^3/UL (ref 4.8–10.8)
PDW BLD AUTO: 9.6 FL (ref 7.9–10.8)
PLATELET # BLD: 236 10^3/UL (ref 130–450)
RBC MAR: 4.25 10^6/UL (ref 4.2–5.4)

## 2020-05-23 PROCEDURE — 82565 ASSAY OF CREATININE: CPT

## 2020-05-23 PROCEDURE — 36415 COLL VENOUS BLD VENIPUNCTURE: CPT

## 2020-05-23 PROCEDURE — 85027 COMPLETE CBC AUTOMATED: CPT

## 2020-11-08 ENCOUNTER — HOSPITAL ENCOUNTER (OUTPATIENT)
Dept: HOSPITAL 76 - EMS | Age: 75
Discharge: ANOTHER HOSPITAL | End: 2020-11-08
Attending: SURGERY
Payer: MEDICARE

## 2020-11-08 ENCOUNTER — HOSPITAL ENCOUNTER (EMERGENCY)
Dept: HOSPITAL 76 - ED | Age: 75
Discharge: HOME | End: 2020-11-08
Payer: MEDICARE

## 2020-11-08 VITALS — DIASTOLIC BLOOD PRESSURE: 70 MMHG | SYSTOLIC BLOOD PRESSURE: 111 MMHG

## 2020-11-08 DIAGNOSIS — E86.0: Primary | ICD-10-CM

## 2020-11-08 DIAGNOSIS — Z20.828: ICD-10-CM

## 2020-11-08 DIAGNOSIS — R06.09: ICD-10-CM

## 2020-11-08 DIAGNOSIS — Z87.891: ICD-10-CM

## 2020-11-08 DIAGNOSIS — N30.00: ICD-10-CM

## 2020-11-08 DIAGNOSIS — R53.1: Primary | ICD-10-CM

## 2020-11-08 DIAGNOSIS — G35: ICD-10-CM

## 2020-11-08 DIAGNOSIS — I10: ICD-10-CM

## 2020-11-08 DIAGNOSIS — R06.02: ICD-10-CM

## 2020-11-08 LAB
ALBUMIN DIAFP-MCNC: 3.8 G/DL (ref 3.2–5.5)
ALBUMIN/GLOB SERPL: 1.2 {RATIO} (ref 1–2.2)
ALP SERPL-CCNC: 60 IU/L (ref 42–121)
ALT SERPL W P-5'-P-CCNC: 18 IU/L (ref 10–60)
ANION GAP SERPL CALCULATED.4IONS-SCNC: 11 MMOL/L (ref 6–13)
AST SERPL W P-5'-P-CCNC: 23 IU/L (ref 10–42)
BASOPHILS NFR BLD AUTO: 0.1 10^3/UL (ref 0–0.1)
BASOPHILS NFR BLD AUTO: 0.6 %
BILIRUB BLD-MCNC: 0.8 MG/DL (ref 0.2–1)
BUN SERPL-MCNC: 34 MG/DL (ref 6–20)
C PNEUM DNA NPH QL NAA+NON-PROBE: NOT DETECTED
CALCIUM UR-MCNC: 9.5 MG/DL (ref 8.5–10.3)
CHLORIDE SERPL-SCNC: 102 MMOL/L (ref 101–111)
CLARITY UR REFRACT.AUTO: CLEAR
CO2 SERPL-SCNC: 24 MMOL/L (ref 21–32)
CREAT SERPLBLD-SCNC: 1.9 MG/DL (ref 0.4–1)
EOSINOPHIL # BLD AUTO: 0.2 10^3/UL (ref 0–0.7)
EOSINOPHIL NFR BLD AUTO: 1.3 %
ERYTHROCYTE [DISTWIDTH] IN BLOOD BY AUTOMATED COUNT: 13 % (ref 12–15)
GLOBULIN SER-MCNC: 3.3 G/DL (ref 2.1–4.2)
GLUCOSE SERPL-MCNC: 127 MG/DL (ref 70–100)
GLUCOSE UR QL STRIP.AUTO: NEGATIVE MG/DL
HGB UR QL STRIP: 11.9 G/DL (ref 12–16)
KETONES UR QL STRIP.AUTO: NEGATIVE MG/DL
LIPASE SERPL-CCNC: 34 U/L (ref 22–51)
LYMPHOCYTES # SPEC AUTO: 0.8 10^3/UL (ref 1.5–3.5)
LYMPHOCYTES NFR BLD AUTO: 5.7 %
MCH RBC QN AUTO: 32.1 PG (ref 27–31)
MCHC RBC AUTO-ENTMCNC: 32.5 G/DL (ref 32–36)
MCV RBC AUTO: 98.7 FL (ref 81–99)
MONOCYTES # BLD AUTO: 1.5 10^3/UL (ref 0–1)
MONOCYTES NFR BLD AUTO: 10.4 %
NEUTROPHILS # BLD AUTO: 11.6 10^3/UL (ref 1.5–6.6)
NEUTROPHILS # SNV AUTO: 14.2 X10^3/UL (ref 4.8–10.8)
NEUTROPHILS NFR BLD AUTO: 81.4 %
NITRITE UR QL STRIP.AUTO: POSITIVE
PDW BLD AUTO: 10.4 FL (ref 7.9–10.8)
PH UR STRIP.AUTO: 5 PH (ref 5–7.5)
PLATELET # BLD: 240 10^3/UL (ref 130–450)
PROT SPEC-MCNC: 7.1 G/DL (ref 6.7–8.2)
PROT UR STRIP.AUTO-MCNC: NEGATIVE MG/DL
RBC # UR STRIP.AUTO: NEGATIVE /UL
RBC MAR: 3.71 10^6/UL (ref 4.2–5.4)
SODIUM SERPLBLD-SCNC: 137 MMOL/L (ref 135–145)
SP GR UR STRIP.AUTO: 1.02 (ref 1–1.03)
SQUAMOUS URNS QL MICRO: (no result)
UROBILINOGEN UR QL STRIP.AUTO: (no result) E.U./DL
UROBILINOGEN UR STRIP.AUTO-MCNC: NEGATIVE MG/DL

## 2020-11-08 PROCEDURE — 36415 COLL VENOUS BLD VENIPUNCTURE: CPT

## 2020-11-08 PROCEDURE — 87077 CULTURE AEROBIC IDENTIFY: CPT

## 2020-11-08 PROCEDURE — 96361 HYDRATE IV INFUSION ADD-ON: CPT

## 2020-11-08 PROCEDURE — 83880 ASSAY OF NATRIURETIC PEPTIDE: CPT

## 2020-11-08 PROCEDURE — 99285 EMERGENCY DEPT VISIT HI MDM: CPT

## 2020-11-08 PROCEDURE — 87181 SC STD AGAR DILUTION PER AGT: CPT

## 2020-11-08 PROCEDURE — 71045 X-RAY EXAM CHEST 1 VIEW: CPT

## 2020-11-08 PROCEDURE — 84484 ASSAY OF TROPONIN QUANT: CPT

## 2020-11-08 PROCEDURE — 80053 COMPREHEN METABOLIC PANEL: CPT

## 2020-11-08 PROCEDURE — 83690 ASSAY OF LIPASE: CPT

## 2020-11-08 PROCEDURE — 96374 THER/PROPH/DIAG INJ IV PUSH: CPT

## 2020-11-08 PROCEDURE — 0202U NFCT DS 22 TRGT SARS-COV-2: CPT

## 2020-11-08 PROCEDURE — 87086 URINE CULTURE/COLONY COUNT: CPT

## 2020-11-08 PROCEDURE — 93005 ELECTROCARDIOGRAM TRACING: CPT

## 2020-11-08 PROCEDURE — 81003 URINALYSIS AUTO W/O SCOPE: CPT

## 2020-11-08 PROCEDURE — 81001 URINALYSIS AUTO W/SCOPE: CPT

## 2020-11-08 PROCEDURE — 85025 COMPLETE CBC W/AUTO DIFF WBC: CPT

## 2020-11-08 NOTE — XRAY REPORT
PROCEDURE:  Chest 1 View X-Ray

 

INDICATIONS:  Chest Pain

 

TECHNIQUE:  One view of the chest was acquired.  

 

COMPARISON:  4/4/2019

 

FINDINGS:  

 

Surgical changes and devices: Surgical clips noted in the right axilla..  

 

Lungs and pleura:  No pleural effusions or pneumothorax.  Stable subcentimeter peripheral left midlun
g zone pulmonary nodule. Lungs are clear.  

 

Mediastinum:  Mediastinal contours appear normal.  Heart size is normal.  

 

Bones and chest wall:  Age-indeterminate posterior lateral right eighth rib fracture. No suspicious b
casey lesions.  Overlying soft tissues appear unremarkable.  

 

IMPRESSION:  

 

1. Age-indeterminate posterior lateral right eighth rib fracture.

 

 

2. Chest without acute cardiopulmonary abnormalities.

 

Reviewed by: Shamar Live MD on 11/8/2020 5:02 PM [facility]

Approved by: Shamar Live MD on 11/8/2020 5:02 PM [facility]

 

 

Station ID:  [id number]

## 2021-03-31 ENCOUNTER — HOSPITAL ENCOUNTER (OUTPATIENT)
Dept: HOSPITAL 76 - LAB | Age: 76
Discharge: HOME | End: 2021-03-31
Attending: INTERNAL MEDICINE
Payer: MEDICARE

## 2021-03-31 DIAGNOSIS — I50.22: Primary | ICD-10-CM

## 2021-03-31 DIAGNOSIS — R06.00: ICD-10-CM

## 2021-03-31 LAB
ALBUMIN DIAFP-MCNC: 4.3 G/DL (ref 3.2–5.5)
ANION GAP SERPL CALCULATED.4IONS-SCNC: 14 MMOL/L (ref 6–13)
BUN SERPL-MCNC: 47 MG/DL (ref 6–20)
CALCIUM UR-MCNC: 10.7 MG/DL (ref 8.5–10.3)
CHLORIDE SERPL-SCNC: 101 MMOL/L (ref 101–111)
CO2 SERPL-SCNC: 24 MMOL/L (ref 21–32)
CREAT SERPLBLD-SCNC: 1.9 MG/DL (ref 0.4–1)
ERYTHROCYTE [DISTWIDTH] IN BLOOD BY AUTOMATED COUNT: 13.2 % (ref 12–15)
GFRSERPLBLD MDRD-ARVRAT: 26 ML/MIN/{1.73_M2} (ref 89–?)
GLUCOSE SERPL-MCNC: 125 MG/DL (ref 70–100)
HCT VFR BLD AUTO: 39.1 % (ref 37–47)
HGB UR QL STRIP: 13.1 G/DL (ref 12–16)
MCH RBC QN AUTO: 32 PG (ref 27–31)
MCHC RBC AUTO-ENTMCNC: 33.5 G/DL (ref 32–36)
MCV RBC AUTO: 95.6 FL (ref 81–99)
NEUTROPHILS # SNV AUTO: 4.9 X10^3/UL (ref 4.8–10.8)
PDW BLD AUTO: 9.9 FL (ref 7.9–10.8)
PHOSPHATE BLD-MCNC: 3.8 MG/DL (ref 2.5–4.6)
PLATELET # BLD: 270 10^3/UL (ref 130–450)
POTASSIUM SERPL-SCNC: 4.7 MMOL/L (ref 3.5–5)
RBC MAR: 4.09 10^6/UL (ref 4.2–5.4)
SODIUM SERPLBLD-SCNC: 139 MMOL/L (ref 135–145)

## 2021-03-31 PROCEDURE — 83880 ASSAY OF NATRIURETIC PEPTIDE: CPT

## 2021-03-31 PROCEDURE — 80048 BASIC METABOLIC PNL TOTAL CA: CPT

## 2021-03-31 PROCEDURE — 85027 COMPLETE CBC AUTOMATED: CPT

## 2021-03-31 PROCEDURE — 36415 COLL VENOUS BLD VENIPUNCTURE: CPT

## 2021-03-31 PROCEDURE — 80069 RENAL FUNCTION PANEL: CPT

## 2021-06-08 ENCOUNTER — HOSPITAL ENCOUNTER (OUTPATIENT)
Dept: HOSPITAL 76 - DI | Age: 76
Discharge: HOME | End: 2021-06-08
Attending: INTERNAL MEDICINE
Payer: MEDICARE

## 2021-06-08 DIAGNOSIS — R06.09: Primary | ICD-10-CM

## 2021-06-08 PROCEDURE — 93308 TTE F-UP OR LMTD: CPT

## 2022-07-12 ENCOUNTER — HOSPITAL ENCOUNTER (OUTPATIENT)
Dept: HOSPITAL 76 - DI.S | Age: 77
Discharge: HOME | End: 2022-07-12
Attending: PHYSICIAN ASSISTANT
Payer: MEDICARE

## 2022-07-12 DIAGNOSIS — M19.012: Primary | ICD-10-CM

## 2022-07-12 NOTE — XRAY REPORT
PROCEDURE:  Shoulder 3 View LT

 

INDICATIONS:  PAIN IN LEFT SHOULDER

 

TECHNIQUE:  3 views of the shoulder were acquired.  

 

COMPARISON:  None.

 

FINDINGS:  

 

Bones:  No fractures or dislocations.  No suspicious bony lesions. Right space narrowing is present a
t the acromioclavicular joint. Articular sclerosis is present at the glenoid. Visualized ribs appear 
intact.  

 

Soft tissues:  No suspicious soft tissue calcifications.  

 

IMPRESSION:  Mild degenerative change.

 

Reviewed by: Gaby Langley MD on 7/12/2022 5:16 PM PDT

Approved by: Gaby Langley MD on 7/12/2022 5:16 PM PDT

 

 

Station ID:  [id number]

## 2023-02-21 ENCOUNTER — HOSPITAL ENCOUNTER (OUTPATIENT)
Dept: HOSPITAL 76 - LAB | Age: 78
Discharge: HOME | End: 2023-02-21
Attending: INTERNAL MEDICINE
Payer: COMMERCIAL

## 2023-02-21 DIAGNOSIS — N18.4: ICD-10-CM

## 2023-02-21 DIAGNOSIS — N17.9: Primary | ICD-10-CM

## 2023-02-21 LAB
ALBUMIN DIAFP-MCNC: 3.9 G/DL (ref 3.2–5.5)
ANION GAP SERPL CALCULATED.4IONS-SCNC: 13 MMOL/L (ref 6–13)
BUN SERPL-MCNC: 28 MG/DL (ref 6–20)
CALCIUM UR-MCNC: 10.7 MG/DL (ref 8.5–10.3)
CHLORIDE SERPL-SCNC: 103 MMOL/L (ref 101–111)
CO2 SERPL-SCNC: 28 MMOL/L (ref 21–32)
CREAT SERPLBLD-SCNC: 1.4 MG/DL (ref 0.4–1)
CREAT UR-SCNC: 104 MG/DL
GFRSERPLBLD MDRD-ARVRAT: 36 ML/MIN/{1.73_M2} (ref 89–?)
GLUCOSE SERPL-MCNC: 106 MG/DL (ref 70–100)
PHOSPHATE BLD-MCNC: 3.5 MG/DL (ref 2.5–4.6)
POTASSIUM SERPL-SCNC: 4.8 MMOL/L (ref 3.5–5)
PROT 24H UR-MCNC: 12 MG/DL
SODIUM SERPLBLD-SCNC: 144 MMOL/L (ref 135–145)

## 2023-02-21 PROCEDURE — 83970 ASSAY OF PARATHORMONE: CPT

## 2023-02-21 PROCEDURE — 36415 COLL VENOUS BLD VENIPUNCTURE: CPT

## 2023-02-21 PROCEDURE — 82570 ASSAY OF URINE CREATININE: CPT

## 2023-02-21 PROCEDURE — 84156 ASSAY OF PROTEIN URINE: CPT

## 2023-02-21 PROCEDURE — 80069 RENAL FUNCTION PANEL: CPT

## 2023-02-21 PROCEDURE — 82306 VITAMIN D 25 HYDROXY: CPT
